# Patient Record
Sex: FEMALE | Race: WHITE | NOT HISPANIC OR LATINO | Employment: UNEMPLOYED | URBAN - METROPOLITAN AREA
[De-identification: names, ages, dates, MRNs, and addresses within clinical notes are randomized per-mention and may not be internally consistent; named-entity substitution may affect disease eponyms.]

---

## 2023-11-03 LAB
ABO + RH BLD: NORMAL
ANTIBODY SCREEN: NEGATIVE
HBV SURFACE AG SERPL QL IA: NEGATIVE
HCV AB SERPL QL IA: NEGATIVE
HIV 1+2 AB+HIV1 P24 AG SERPL QL IA: NEGATIVE
RUBELLA AB, IGG (OLG): 328.7 IU/ML
RUBELLA IMMUNE STATUS: NORMAL

## 2024-03-08 ENCOUNTER — APPOINTMENT (OUTPATIENT)
Dept: LAB | Facility: HOSPITAL | Age: 24
End: 2024-03-08
Attending: OBSTETRICS & GYNECOLOGY
Payer: MEDICAID

## 2024-03-08 DIAGNOSIS — T50.905A IMPAIRED GLUCOSE TOLERANCE ASSOCIATED WITH DRUGS: Primary | ICD-10-CM

## 2024-03-08 DIAGNOSIS — O36.0120 MATERNAL CARE FOR ANTI-D (RH) ANTIBODIES IN SECOND TRIMESTER: ICD-10-CM

## 2024-03-08 DIAGNOSIS — R73.02 IMPAIRED GLUCOSE TOLERANCE ASSOCIATED WITH DRUGS: Primary | ICD-10-CM

## 2024-03-08 LAB
GLUCOSE 1H P 100 G GLC PO SERPL-MCNC: 183 MG/DL (ref 100–180)
GLUCOSE 2H P 100 G GLC PO SERPL-MCNC: 176 MG/DL (ref 70–140)
GLUCOSE 3H P 100 G GLC PO SERPL-MCNC: 141 MG/DL (ref 70–115)
GLUCOSE P FAST SERPL-MCNC: 78 MG/DL (ref 70–100)
GROUP & RH: NORMAL
INDIRECT COOMBS: NORMAL
PATH REV: NORMAL
POCT GLUCOSE: 84 MG/DL (ref 70–110)
SPECIMEN OUTDATE: NORMAL

## 2024-03-08 PROCEDURE — 86850 RBC ANTIBODY SCREEN: CPT | Performed by: OBSTETRICS & GYNECOLOGY

## 2024-03-08 PROCEDURE — 82947 ASSAY GLUCOSE BLOOD QUANT: CPT

## 2024-03-08 PROCEDURE — 82951 GLUCOSE TOLERANCE TEST (GTT): CPT

## 2024-03-08 PROCEDURE — 82952 GTT-ADDED SAMPLES: CPT

## 2024-03-08 PROCEDURE — 36415 COLL VENOUS BLD VENIPUNCTURE: CPT

## 2024-03-08 PROCEDURE — 82950 GLUCOSE TEST: CPT

## 2024-03-20 ENCOUNTER — LAB VISIT (OUTPATIENT)
Dept: LAB | Facility: HOSPITAL | Age: 24
End: 2024-03-20
Attending: OBSTETRICS & GYNECOLOGY
Payer: MEDICAID

## 2024-03-20 DIAGNOSIS — Z36.89 SCREENING FOR PREGNANCY-ASSOCIATED PLASMA PROTEIN A: Primary | ICD-10-CM

## 2024-03-20 LAB
ALBUMIN SERPL-MCNC: 2.7 G/DL (ref 3.5–5)
ALP SERPL-CCNC: 155 UNIT/L (ref 40–150)
ALT SERPL-CCNC: 38 UNIT/L (ref 0–55)
AST SERPL-CCNC: 39 UNIT/L (ref 5–34)
BILIRUB SERPL-MCNC: 0.4 MG/DL
BILIRUBIN DIRECT+TOT PNL SERPL-MCNC: 0.2 MG/DL (ref 0–0.8)
BILIRUBIN DIRECT+TOT PNL SERPL-MCNC: 0.2 MG/DL (ref 0–?)
PATH REV: NORMAL
PROT SERPL-MCNC: 5.9 GM/DL (ref 6.4–8.3)

## 2024-03-20 PROCEDURE — 36415 COLL VENOUS BLD VENIPUNCTURE: CPT

## 2024-03-20 PROCEDURE — 80076 HEPATIC FUNCTION PANEL: CPT

## 2024-03-20 PROCEDURE — 82542 COL CHROMOTOGRAPHY QUAL/QUAN: CPT

## 2024-03-22 LAB
BILE AC SERPL-SCNC: 14.42 NMOL/ML
CDCAE SERPL-SCNC: 3.76 NMOL/ML
CHOLATE SERPL-SCNC: 7.61 NMOL/ML
DO-CHOLATE SERPL-SCNC: 2.43 NMOL/ML
URSODEOXYCHOLATE SERPL-SCNC: 0.62 NMOL/ML

## 2024-03-25 DIAGNOSIS — R79.89 ELEVATED LFTS: ICD-10-CM

## 2024-03-25 DIAGNOSIS — Z36.89 ENCOUNTER FOR FETAL ANATOMIC SURVEY: Primary | ICD-10-CM

## 2024-03-25 DIAGNOSIS — L29.9 ITCHING: ICD-10-CM

## 2024-03-27 ENCOUNTER — OFFICE VISIT (OUTPATIENT)
Dept: MATERNAL FETAL MEDICINE | Facility: CLINIC | Age: 24
End: 2024-03-27
Payer: MEDICAID

## 2024-03-27 ENCOUNTER — PROCEDURE VISIT (OUTPATIENT)
Dept: MATERNAL FETAL MEDICINE | Facility: CLINIC | Age: 24
End: 2024-03-27
Payer: MEDICAID

## 2024-03-27 ENCOUNTER — LAB VISIT (OUTPATIENT)
Dept: LAB | Facility: HOSPITAL | Age: 24
End: 2024-03-27
Attending: OBSTETRICS & GYNECOLOGY
Payer: MEDICAID

## 2024-03-27 VITALS
DIASTOLIC BLOOD PRESSURE: 85 MMHG | HEART RATE: 82 BPM | SYSTOLIC BLOOD PRESSURE: 126 MMHG | HEIGHT: 66 IN | WEIGHT: 150 LBS | BODY MASS INDEX: 24.11 KG/M2

## 2024-03-27 DIAGNOSIS — Z67.91 RH NEGATIVE STATUS DURING PREGNANCY IN THIRD TRIMESTER: ICD-10-CM

## 2024-03-27 DIAGNOSIS — R79.89 ELEVATED LFTS: ICD-10-CM

## 2024-03-27 DIAGNOSIS — R74.8 ELEVATED LIVER ENZYMES: ICD-10-CM

## 2024-03-27 DIAGNOSIS — O24.419 GESTATIONAL DIABETES MELLITUS (GDM) IN THIRD TRIMESTER, GESTATIONAL DIABETES METHOD OF CONTROL UNSPECIFIED: ICD-10-CM

## 2024-03-27 DIAGNOSIS — F41.9 ANXIETY DURING PREGNANCY IN THIRD TRIMESTER, ANTEPARTUM: ICD-10-CM

## 2024-03-27 DIAGNOSIS — O16.3 ELEVATED BLOOD PRESSURE AFFECTING PREGNANCY IN THIRD TRIMESTER, ANTEPARTUM: ICD-10-CM

## 2024-03-27 DIAGNOSIS — L29.9 ITCHING: ICD-10-CM

## 2024-03-27 DIAGNOSIS — O26.643 CHOLESTASIS DURING PREGNANCY IN THIRD TRIMESTER: Primary | ICD-10-CM

## 2024-03-27 DIAGNOSIS — Z36.89 ENCOUNTER FOR FETAL ANATOMIC SURVEY: ICD-10-CM

## 2024-03-27 DIAGNOSIS — O99.343 ANXIETY DURING PREGNANCY IN THIRD TRIMESTER, ANTEPARTUM: ICD-10-CM

## 2024-03-27 DIAGNOSIS — O26.893 RH NEGATIVE STATUS DURING PREGNANCY IN THIRD TRIMESTER: ICD-10-CM

## 2024-03-27 LAB
ALT SERPL-CCNC: 19 UNIT/L (ref 0–55)
AST SERPL-CCNC: 19 UNIT/L (ref 5–34)
CREAT SERPL-MCNC: 0.87 MG/DL (ref 0.55–1.02)
ERYTHROCYTE [DISTWIDTH] IN BLOOD BY AUTOMATED COUNT: 13.2 % (ref 11.5–17)
GFR SERPLBLD CREATININE-BSD FMLA CKD-EPI: >60 MLS/MIN/1.73/M2
HCT VFR BLD AUTO: 36.7 % (ref 37–47)
HGB BLD-MCNC: 11.9 G/DL (ref 12–16)
LDH SERPL-CCNC: 178 U/L (ref 125–220)
MCH RBC QN AUTO: 29 PG (ref 27–31)
MCHC RBC AUTO-ENTMCNC: 32.4 G/DL (ref 33–36)
MCV RBC AUTO: 89.5 FL (ref 80–94)
NRBC BLD AUTO-RTO: 0 %
PLATELET # BLD AUTO: 263 X10(3)/MCL (ref 130–400)
PMV BLD AUTO: 9 FL (ref 7.4–10.4)
RBC # BLD AUTO: 4.1 X10(6)/MCL (ref 4.2–5.4)
URATE SERPL-MCNC: 4.5 MG/DL (ref 2.6–6)
WBC # SPEC AUTO: 13.2 X10(3)/MCL (ref 4.5–11.5)

## 2024-03-27 PROCEDURE — 3079F DIAST BP 80-89 MM HG: CPT | Mod: CPTII,S$GLB,, | Performed by: OBSTETRICS & GYNECOLOGY

## 2024-03-27 PROCEDURE — 36415 COLL VENOUS BLD VENIPUNCTURE: CPT

## 2024-03-27 PROCEDURE — 84450 TRANSFERASE (AST) (SGOT): CPT

## 2024-03-27 PROCEDURE — 76805 OB US >/= 14 WKS SNGL FETUS: CPT | Mod: S$GLB,,, | Performed by: OBSTETRICS & GYNECOLOGY

## 2024-03-27 PROCEDURE — 76819 FETAL BIOPHYS PROFIL W/O NST: CPT | Mod: S$GLB,,, | Performed by: OBSTETRICS & GYNECOLOGY

## 2024-03-27 PROCEDURE — 84460 ALANINE AMINO (ALT) (SGPT): CPT

## 2024-03-27 PROCEDURE — 99204 OFFICE O/P NEW MOD 45 MIN: CPT | Mod: TH,S$GLB,, | Performed by: OBSTETRICS & GYNECOLOGY

## 2024-03-27 PROCEDURE — 1160F RVW MEDS BY RX/DR IN RCRD: CPT | Mod: CPTII,S$GLB,, | Performed by: OBSTETRICS & GYNECOLOGY

## 2024-03-27 PROCEDURE — 84550 ASSAY OF BLOOD/URIC ACID: CPT

## 2024-03-27 PROCEDURE — 83615 LACTATE (LD) (LDH) ENZYME: CPT

## 2024-03-27 PROCEDURE — 82565 ASSAY OF CREATININE: CPT

## 2024-03-27 PROCEDURE — 3008F BODY MASS INDEX DOCD: CPT | Mod: CPTII,S$GLB,, | Performed by: OBSTETRICS & GYNECOLOGY

## 2024-03-27 PROCEDURE — 3074F SYST BP LT 130 MM HG: CPT | Mod: CPTII,S$GLB,, | Performed by: OBSTETRICS & GYNECOLOGY

## 2024-03-27 PROCEDURE — 1159F MED LIST DOCD IN RCRD: CPT | Mod: CPTII,S$GLB,, | Performed by: OBSTETRICS & GYNECOLOGY

## 2024-03-27 PROCEDURE — 85027 COMPLETE CBC AUTOMATED: CPT

## 2024-03-27 RX ORDER — HYDROXYZINE HYDROCHLORIDE 25 MG/1
25 TABLET, FILM COATED ORAL 3 TIMES DAILY PRN
Qty: 60 TABLET | Refills: 2 | Status: SHIPPED | OUTPATIENT
Start: 2024-03-27

## 2024-03-27 RX ORDER — LACTULOSE 10 G/15ML
SOLUTION ORAL; RECTAL
COMMUNITY
Start: 2024-03-23 | End: 2024-04-08

## 2024-03-27 RX ORDER — URSODIOL 500 MG/1
500 TABLET, FILM COATED ORAL 2 TIMES DAILY
Qty: 60 TABLET | Refills: 3 | Status: SHIPPED | OUTPATIENT
Start: 2024-03-27 | End: 2025-03-27

## 2024-03-27 RX ORDER — INSULIN PUMP SYRINGE, 3 ML
EACH MISCELLANEOUS
Qty: 1 EACH | Refills: 0 | Status: SHIPPED | OUTPATIENT
Start: 2024-03-27 | End: 2024-04-08

## 2024-03-27 RX ORDER — CALCIUM CARBONATE 500(1250)
2 TABLET,CHEWABLE ORAL DAILY
COMMUNITY
End: 2024-04-08

## 2024-03-27 RX ORDER — BLOOD-GLUCOSE CONTROL, NORMAL
1 EACH MISCELLANEOUS 4 TIMES DAILY
Qty: 100 EACH | Refills: 4 | Status: SHIPPED | OUTPATIENT
Start: 2024-03-27 | End: 2024-04-08

## 2024-03-27 NOTE — PROGRESS NOTES
Maternal Fetal Medicine New Consult    Subjective:     Patient ID: [id number]    Chief Complaint: MFM consult with US (Cholestasis,  Rh Negative.    )      HPI: 23 y.o.  female  at 33w1d gestation with Estimated Date of Delivery: 24 by LMP, consistent with early US. She is sent for MFM consultation for elevated LFTs, elevated bile acids.      Patient reports generalized itching concentrated to the hands and feet as well as abdomen and back for the past 2 weeks.  On 3/20/2024, she had mildly elevated LFTs with ALT 38,AST 39, and mildly elevated bile acids at 14.42.  She is Rh negative and received RhoGAM on 3/8/2024.  She had elevated 3 hour GTT diagnostic of GDM on 3/8/2024.  She has not started checking sugars or following diabetic diet yet.  She has history of anxiety and is currently on Lexapro 20 mg daily and feeling well.  No acute symptoms at this time.  She is accompanied by her mother Maria Ines       She denies any personal or family history of aneuploidy or anomalies. She denies any exposure to high fevers, viral rashes, illicit drugs or alcohol in this pregnancy.  She denies any leaking fluid, vaginal bleeding, contractions, decreased fetal movement. Denies headaches, visual disturbances, or epigastric pain.       Pregnancy complications include:  Patient Active Problem List   Diagnosis    Cholestasis during pregnancy in third trimester    Rh negative status during pregnancy in third trimester    Gestational diabetes mellitus (GDM) in third trimester    Anxiety during pregnancy in third trimester, antepartum    Elevated blood pressure affecting pregnancy in third trimester, antepartum       Past Medical History:   Diagnosis Date    Anxiety     Pregnancy 2023       History reviewed. No pertinent surgical history.    Family History   Problem Relation Age of Onset    Eclampsia Paternal Grandfather     Diabetes Paternal Grandfather      labor Paternal Grandmother     Eclampsia Paternal  Grandmother     Stroke Maternal Grandmother     Miscarriages / Stillbirths Maternal Grandmother     Diabetes Maternal Grandmother     Eclampsia Maternal Grandfather        Social History     Socioeconomic History    Marital status:    Tobacco Use    Smoking status: Former     Types: Vaping with nicotine, Cigarettes    Smokeless tobacco: Never   Substance and Sexual Activity    Alcohol use: Never     Comment: a drink once every so many months    Drug use: Not Currently     Types: Marijuana     Comment: SSRIS Lexapro currently    Sexual activity: Yes     Partners: Male     Birth control/protection: None       Current Outpatient Medications   Medication Sig Dispense Refill    calcium carbonate (OS-ALMA ROSA) 500 mg calcium (1,250 mg) chewable tablet Take 1 tablet by mouth once daily.      EScitalopram oxalate (LEXAPRO) 20 MG tablet       VITAFOL GUMMIES 3.33 mg iron- 0.33 mg Chew Take 1 tablet by mouth.      blood sugar diagnostic Strp 1 each by Misc.(Non-Drug; Combo Route) route 4 (four) times daily. 200 each 4    blood-glucose meter kit Use as instructed to check blood glucose 1 each 0    hydrOXYzine HCL (ATARAX) 25 MG tablet Take 1 tablet (25 mg total) by mouth 3 (three) times daily as needed for Itching. 60 tablet 2    lactulose (CHRONULAC) 10 gram/15 mL solution TAKE 15 ML BY MOUTH DAILY AS NEEDED FOR CONSTIPATION      lancets 30 gauge Misc 1 lancet  by Misc.(Non-Drug; Combo Route) route 4 (four) times daily. 100 each 4    prenatal 26-iron ps-folic-dha (VITAFOL-ONE) 29 mg iron- 1 mg-200 mg Cap Take 1 tablet by mouth Daily. (Patient not taking: Reported on 3/27/2024) 30 capsule 11    prenatal no122/iron/folic acid (PRENATAL MULTI ORAL) Take by mouth.      ursodioL (ACTIGALL) 500 MG tablet Take 1 tablet (500 mg total) by mouth 2 (two) times daily. 60 tablet 3     No current facility-administered medications for this visit.       Review of patient's allergies indicates:  No Known Allergies    Medications:  Current  "Outpatient Medications   Medication Instructions    blood sugar diagnostic Strp 1 each, Misc.(Non-Drug; Combo Route), 4 times daily    blood-glucose meter kit Use as instructed to check blood glucose    calcium carbonate (OS-ALMA ROSA) 500 mg calcium (1,250 mg) chewable tablet 1 tablet, Oral, Daily    EScitalopram oxalate (LEXAPRO) 20 MG tablet No dose, route, or frequency recorded.    hydrOXYzine HCL (ATARAX) 25 mg, Oral, 3 times daily PRN    lactulose (CHRONULAC) 10 gram/15 mL solution TAKE 15 ML BY MOUTH DAILY AS NEEDED FOR CONSTIPATION    lancets 30 gauge Misc 1 lancet , Misc.(Non-Drug; Combo Route), 4 times daily    prenatal 26-iron ps-folic-dha (VITAFOL-ONE) 29 mg iron- 1 mg-200 mg Cap 1 tablet, Oral, Daily    prenatal no122/iron/folic acid (PRENATAL MULTI ORAL) Oral    ursodioL (ACTIGALL) 500 mg, Oral, 2 times daily    VITAFOL GUMMIES 3.33 mg iron- 0.33 mg Chew 1 tablet, Oral       Review of Systems   12 point review of systems conducted, negative except as stated in the history of present illness. See HPI for details.      Objective:     Visit Vitals  /85 (BP Location: Left arm, Patient Position: Sitting, BP Method: Medium (Automatic))   Pulse 82   Ht 5' 6" (1.676 m)   Wt 68 kg (150 lb)   LMP 08/08/2023 (Approximate)   BMI 24.21 kg/m²        Physical Exam  Vitals and nursing note reviewed.   Constitutional:       General: She is not in acute distress.     Appearance: Normal appearance.   HENT:      Head: Normocephalic and atraumatic.      Nose: Nose normal. No congestion.      Mouth/Throat:      Pharynx: Oropharynx is clear.   Eyes:      General: No scleral icterus.     Conjunctiva/sclera: Conjunctivae normal.   Cardiovascular:      Rate and Rhythm: Normal rate and regular rhythm.   Pulmonary:      Effort: No respiratory distress.      Breath sounds: Normal breath sounds. No wheezing.   Abdominal:      General: Abdomen is flat.      Palpations: Abdomen is soft.      Tenderness: There is no abdominal " tenderness. There is no right CVA tenderness, left CVA tenderness or guarding.      Comments: No CVA tenderness gravid uterus.    Musculoskeletal:         General: Normal range of motion.      Cervical back: Neck supple.      Right lower leg: No edema.      Left lower leg: No edema.   Skin:     General: Skin is warm.      Findings: No bruising or rash.   Neurological:      General: No focal deficit present.      Mental Status: She is oriented to person, place, and time.      Deep Tendon Reflexes: Reflexes normal.      Comments: Normal reflexes   Psychiatric:         Mood and Affect: Mood normal.         Behavior: Behavior normal.         Thought Content: Thought content normal.         Judgment: Judgment normal.         Assessment/Plan:     23 y.o.  female with IUP at 33w1d    Cholestasis of pregnancy  There is normal fetal growth with an EFW of 2285 g at the 36% and the AC at the 68% on 3/27/2024.  AFV is normal. BPP is 8/8 today.     Cholestasis of pregnancy is usually diagnosed mid to late pregnancy and is characterized by significant, generalized itching that sometimes worsens at night and elevated serum bile acids/amniotransferases in the absence of other hepatic pathology. Etiology is unknown, but seems to have genetic predisposition, peak estrogens level in third trimester and/or environmental related. Incidence of recurrence is 60-70% and up to 90% in some studies, with risk of recurrence thought to be lower if index pregnancy was a multiple gestation pregnancy. There is increasing data to suggest a higher risk of hepatobiliary disease later in life. Patient should have repeated bile acids and LFT 6-8 weeks postpartum, and referral to appropriate specialist should be done if persistent abnormalities. Children of mothers with intrahepatic cholestasis of pregnancy may be at higher risk of increased BMI and dyslipidemia at 16 years of age.      Treatment is focused on reducing pruritus and preventing  maternal/fetal complications. There are risks for fat soluble vitamin deficiency, steatorrhea, Vitamin-K-deficiency-induced hypoprothrombinemia that could increase risk for hemorrhage.  There is small risk for fetal prematurity, meconium stained amniotic fluid,  respiratory distress syndrome and FDIU, particularly if serum bile acids are over 40 micromoles/L. Risk of fetal demise is increased if serum bile acid is over 100 micromoles/L.      Treatment of choice is Ursodeoxycholic acid given as 500mg po bid or 300mg tid (with dose adjusted up to 2 g/day if needed) till delivery. Second line choice is cholestyramine 8-16 g/day and starting dose 4g po bid; however, there is risk for steatorrhea as a side effect.  Unfortunately, a randomized study (PITCHES study, 2019) showed that treatment with ursodeoxycholic acid did not decrease adverse fetal outcome (composite of  death, NICU admissions or  delivery), had mild improvement in itching symptoms, and did not reduce bile acid levels, putting into question this treatment modality.  Symptoms may improve with treatment and resolve a few days post delivery with no long term sequela, except for potentially higher risk of hepatobiliary disease later in life as mentioned above.       Prescription sent for ursodiol 500 mg BID and Vistaril 25 mg TID PRN.      Gestational diabetes mellitus  The incidence of diabetes in pregnancy is 6-7%, and about 85% represent GDM. I discussed with her risks associated with diabetes in pregnancy including higher risk for polyhydramnios, fetal macrosomia, lower Apgar scores and  metabolic complications (hypoglycemia, hyperbilirubinemia, hypocalcemia, erythema) and developing preeclampsia    Currently, she was advised that she needs to be on 2200 calorie diabetic diet.It is recommended that she have 30-45 grams of carbohydrates with breakfast, a mid-morning snack with 15-30 grams of carbohydrates, 45-60 grams of  carbohydrates with lunch, a mid-afternoon snack with 15-30 grams of carbohydrates, 45-60 grams of carbohydrates with dinner, and a bedtime snack with 15-30 grams of carbohydrates. The importance of avoiding any significant weight gain till the end of the pregnancy was discussed.  She will be monitoring her sugars at home.  If the 1 hour postprandial greater than 140 and pre-prandial blood sugar greater than 90, then she will let me know.        I have shared with her the options of treatment including insulin treatment which is the gold standard of care for diabetes in pregnancy versus a recent use of alternatives, such as glyburide or metformin (both crosses the placenta). Although, glyburide has been used over the past 10 years as primary alternative to insulin, a recent meta-analysis (2015) suggested that metformin should be the alternative to insulin and not glyburide. The conclusion of the study showed a higher birth weight (100 g) associated with glyburide compared to insulin, two fold higher risk of  hypoglycemia, and more than 2x higher risk of macrosomia associated with glyburide use. This difference is likely to be secondary to hyperinsulinism in fetus secondary to fetal exposure to glyburide.  Metformin, also had lower maternal weight gain, lower birth rate and less risk of macrosomia when compared with glyburide. When compared to insulin, Metformin had lower maternal weight gain, increase in  birth and lower gestational age at delivery and lower incidence of gestational hypertension. There was a trend for less  hypoglycemia and high failure rate of 30-35%, when compared to insulin.  In addition, the lack of long term data on glyburide and metformin use regarding safety was addressed and recommended use of Insulin for treatment, which is the gold standard, with excellent efficiency and safety, albeit more inconvenience.  Questions were answered.     Advised patient to follow a  diabetic diet and start checking her sugars 4 times a day, and send log to us weekly.    If she does not need insulin, then regular pregnancy management could be done with consideration for fetal testing after 38 weeks gestation with awaiting normal spontaneous onset of labor and management of the pregnancy. If Insulin is needed, then closer fetal surveillance will be needed. She was advised to continue fetal kick counts 3 times daily, with immediate reporting of decreased fetal movements (<10 movements/hr).    The association of gestational diabetes (50%) with adult-onset type 2 diabetes mellitus was reviewed.  She was advised of importance of losing weight after the pregnancy, as well as doing a 75g 2hr glucose tolerance test after postpartum exam, and checkups every 1-3 years for blood sugars to make sure she does not develop diabetes.        Rh negative, antepartum  Routine administration of antepartum and postpartum anti-D immune globulin (RhoGAM) has reduced the prevalence of D alloimmunization to 0.1 to 0.3 percent of Rh-negative pregnant people. Patients who are Rh-negative should receive RhoGAM injection around 28 weeks and within 72 hours of delivery. Administration of RhoGAM is also recommended when there is an increased risk of fetomaternal bleeding. She was advised to report any vaginal bleeding during pregnancy to determine whether additional RhoGAM injection is indicated.       Labile blood pressure reading without history of hypertension  BP Readings from Last 1 Encounters:   03/27/24 126/85     Patient is asymptomatic.  Advised her that if blood pressure is persistently elevated it maybe concerning for development of gestational hypertension/preeclampsia.  Out of caution, it was agreed to do preeclampsia labs since she is at risk for preeclampsia.  Patient will go to do the lab work today.  Preeclampsia warnings were given with instructions to come in immediately if she has any headaches, vision  problems, epigastric pain, or decreased fetal movement at any time.        Anxiety during pregnancy, on Lexapro  Discussed risks with depression/anxiety and risks/benefits of medication use in pregnancy. Although earlier limited data suggested association of paroxetine (Paxil) with right ventricular outflow tract obstruction and sertraline (Zoloft) with ventricular septal heart defects, a recent (2014) large population based study showed no substantial increase in the risk of cardiac malformations attributable to antidepressant use in 1st trimester. The absolute risk of other reported risks in some studies is very small: omphalocele of 1 in 5,000 births, craniosynostosis 1 in 1,800 births, and anencephaly of 1 in 1,000 births. I discussed with her that the SSRI medication used in pregnancy is associated with potential small risk of pulmonary hypertension when used after 20 weeks gestation (in 6-12 per thousand exposed women). However, discontinuing medication is associated with a higher risk with recurrence of symptoms . Relapse rate is 68% if medication is discontinued, vs 25% with continued medication use. Untreated depression may increase the risk of low weight gain, sexually transmitted diseases, alcohol & substance abuse, all of which have maternal and fetal health implications. Factors associated with relapse during pregnancy include a history of more than 5 years of depressive illness and of more than four episodes of relapse.     ACOG recommends that therapy for mental health disorders during pregnancy be individualized. Treatment of anxiety and depression should incorporate the clinical expertise of her mental health clinician, her obstetrician, her primary care provider, and her pediatrician. The risks of untreated depression and the benefits of treatment must be weighed against the risks associated with the use of psychiatric medications during pregnancy.    Depending upon the severity of her symptoms and  previous response to discontinued medication, consider weaning down or off medications if possible. Although discontinuing the medication for a few weeks before delivery  has been suggested, to avoid  withdrawal, the potential risks to mom and lack of proven benefit from this approach, makes continuing medication till delivery a reasonable option. Discussed the association of higher  morbidity with SSRI medication use close to delivery (in 30 % of neonates) including higher rate of admission to intensive care unit, jitteriness, mild respiratory distress, tachypnea of the , weak cry, and poor tone.     With symptom control, reasonable to continue Lexapro 20 mg daily at this time. She was also advised to report any worsening of symptoms or SI/HI tendencies immediately to provider/ER for prompt intervention.  She was advised to continue follow up care with her Mental Health provider.      Follow up in about 5 days (around 2024) for [facility] follow-up, BPP.     Future Appointments   Date Time Provider Department Center   2024  2:30 PM Josse Rodriguez MD [facility] [place] [facility]   2024  2:30 PM ROOM 1 AND 2, [facility] [place] [facility]   4/10/2024  1:30 PM Suzie Mcintosh PA [facility]        New diagnosis of cholestasis of pregnancy, with mild elevation of bile acid.  His itching start patient on Ursodiol 500 mg b.i.d. along with Vistaril p.r.n..  We will be rechecking levels in a couple of weeks.  Blood pressure was labile or other preeclampsia labs that were reassuring.3/27/24: platelets 263, AST 19, ALT19, uric acid 4.5,cr 0.87, .  Patient was counseled on the management of gestational diabetes flatus.  Prescription for glucometer machine and diet information given.  Will monitor the sugars and treat if needed.  With mild elevation in bile acids patient could be delivered around 38 -39 weeks gestation, as long as symptoms are well controlled with no significant  increasing bile acids over 40, and no additional complications requiring earlier delivery. Patient's questions were answered.  She is in agreement with plan of care. Discussed with Dr Suazo, who will see patient later in week.         Patient was evaluated by LAURA Helms and Dr. Rodriguez.  Final assessment and recommendations as stated above were made by Dr. Rodriguez.    This note was created with the assistance of [employer] voice recognition software. There may be transcription errors as a result of using this technology, however minimal. Effort has been made to ensure accuracy of transcription, but any obvious errors or omissions should be clarified with the author of the document.

## 2024-03-28 DIAGNOSIS — R74.8 ELEVATED LIVER ENZYMES: ICD-10-CM

## 2024-03-28 DIAGNOSIS — Z36.89 ENCOUNTER FOR FETAL ANATOMIC SURVEY: Primary | ICD-10-CM

## 2024-03-28 DIAGNOSIS — R79.89 ELEVATED LFTS: ICD-10-CM

## 2024-04-01 ENCOUNTER — OFFICE VISIT (OUTPATIENT)
Dept: MATERNAL FETAL MEDICINE | Facility: CLINIC | Age: 24
End: 2024-04-01
Payer: MEDICAID

## 2024-04-01 ENCOUNTER — PROCEDURE VISIT (OUTPATIENT)
Dept: MATERNAL FETAL MEDICINE | Facility: CLINIC | Age: 24
End: 2024-04-01
Payer: MEDICAID

## 2024-04-01 VITALS
WEIGHT: 152 LBS | HEIGHT: 66 IN | BODY MASS INDEX: 24.43 KG/M2 | SYSTOLIC BLOOD PRESSURE: 127 MMHG | HEART RATE: 84 BPM | DIASTOLIC BLOOD PRESSURE: 81 MMHG

## 2024-04-01 DIAGNOSIS — O16.3 ELEVATED BLOOD PRESSURE AFFECTING PREGNANCY IN THIRD TRIMESTER, ANTEPARTUM: ICD-10-CM

## 2024-04-01 DIAGNOSIS — R74.8 ELEVATED LIVER ENZYMES: ICD-10-CM

## 2024-04-01 DIAGNOSIS — O26.643 CHOLESTASIS DURING PREGNANCY IN THIRD TRIMESTER: ICD-10-CM

## 2024-04-01 DIAGNOSIS — Z67.91 RH NEGATIVE STATUS DURING PREGNANCY IN THIRD TRIMESTER: ICD-10-CM

## 2024-04-01 DIAGNOSIS — R79.89 ELEVATED LFTS: ICD-10-CM

## 2024-04-01 DIAGNOSIS — O26.893 RH NEGATIVE STATUS DURING PREGNANCY IN THIRD TRIMESTER: ICD-10-CM

## 2024-04-01 DIAGNOSIS — O99.343 ANXIETY DURING PREGNANCY IN THIRD TRIMESTER, ANTEPARTUM: Primary | ICD-10-CM

## 2024-04-01 DIAGNOSIS — O24.419 GESTATIONAL DIABETES MELLITUS (GDM) IN THIRD TRIMESTER, GESTATIONAL DIABETES METHOD OF CONTROL UNSPECIFIED: ICD-10-CM

## 2024-04-01 DIAGNOSIS — F41.9 ANXIETY DURING PREGNANCY IN THIRD TRIMESTER, ANTEPARTUM: Primary | ICD-10-CM

## 2024-04-01 DIAGNOSIS — Z36.89 ENCOUNTER FOR FETAL ANATOMIC SURVEY: ICD-10-CM

## 2024-04-01 PROCEDURE — 3074F SYST BP LT 130 MM HG: CPT | Mod: CPTII,S$GLB,, | Performed by: OBSTETRICS & GYNECOLOGY

## 2024-04-01 PROCEDURE — 99214 OFFICE O/P EST MOD 30 MIN: CPT | Mod: TH,S$GLB,, | Performed by: OBSTETRICS & GYNECOLOGY

## 2024-04-01 PROCEDURE — 76819 FETAL BIOPHYS PROFIL W/O NST: CPT | Mod: S$GLB,,, | Performed by: OBSTETRICS & GYNECOLOGY

## 2024-04-01 PROCEDURE — 3079F DIAST BP 80-89 MM HG: CPT | Mod: CPTII,S$GLB,, | Performed by: OBSTETRICS & GYNECOLOGY

## 2024-04-01 PROCEDURE — 1160F RVW MEDS BY RX/DR IN RCRD: CPT | Mod: CPTII,S$GLB,, | Performed by: OBSTETRICS & GYNECOLOGY

## 2024-04-01 PROCEDURE — 3008F BODY MASS INDEX DOCD: CPT | Mod: CPTII,S$GLB,, | Performed by: OBSTETRICS & GYNECOLOGY

## 2024-04-01 PROCEDURE — 1159F MED LIST DOCD IN RCRD: CPT | Mod: CPTII,S$GLB,, | Performed by: OBSTETRICS & GYNECOLOGY

## 2024-04-01 RX ORDER — LANCETS 33 GAUGE
EACH MISCELLANEOUS 4 TIMES DAILY
COMMUNITY
Start: 2024-03-28

## 2024-04-01 RX ORDER — BLOOD-GLUCOSE METER
EACH MISCELLANEOUS
COMMUNITY
Start: 2024-03-28

## 2024-04-01 NOTE — PROGRESS NOTES
Maternal Fetal Medicine Follow Up    Subjective:     Patient ID: [id number]    Chief Complaint: MFM Follow up with US (GDM.  Patient is having dizziness and cramping.  Patient is having itching.)      HPI: Em Longoria is a 23 y.o. female  at 33w6d gestation with Estimated Date of Delivery: 24  who is here for follow-up consultation by M.    Patient was diagnosed with cholestasis on 3/20/2024 after she had labs for reported generalized itching.  On 3/20/2024, she had mildly elevated LFTs with ALT 38, AST 39, and mildly elevated bile acids at 14.42.  She was on ursodiol 500 mg twice daily and Vistaril 25 mg 3 times a day as needed.  She is Rh negative and received RhoGAM on 3/8/2024.  She has GDM and is following a diabetic diet and monitoring her sugars.  Her fasting blood sugars are up to 93 and all postprandial blood sugars are normal less than 122 1 hour postprandials.  She has history of anxiety and is currently on Lexapro 20 mg daily and feeling well.  No acute symptoms at this time.  She had a labile blood pressure reading on 3/27/2024, for which preeclampsia labs were ordered and were reassuring with platelets 263, AST 19, ALT19, uric acid 4.5, creatinine 0.87, .  Patient is accompanied by her     Patient reported that she had more itching last night and this morning but she has not tried the Vistaril yet.       Interval history since last M visit: None.. She denies any leaking fluid, vaginal bleeding, contractions, decreased fetal movement. Denies headaches, visual disturbances, or epigastric pain.    Pregnancy complications include:   Patient Active Problem List   Diagnosis    Cholestasis during pregnancy in third trimester    Rh negative status during pregnancy in third trimester    Gestational diabetes mellitus (GDM) in third trimester    Anxiety during pregnancy in third trimester, antepartum    Elevated blood pressure affecting pregnancy in third trimester, antepartum       No  "changes to medical, surgical, family, social, or obstetric history.    Medications:  Current Outpatient Medications   Medication Instructions    blood sugar diagnostic Strp 1 each, Misc.(Non-Drug; Combo Route), 4 times daily    blood-glucose meter kit Use as instructed to check blood glucose    calcium carbonate (OS-ALMA ROSA) 500 mg calcium (1,250 mg) chewable tablet 1 tablet, Oral, Daily    EScitalopram oxalate (LEXAPRO) 20 MG tablet No dose, route, or frequency recorded.    hydrOXYzine HCL (ATARAX) 25 mg, Oral, 3 times daily PRN    lactulose (CHRONULAC) 10 gram/15 mL solution TAKE 15 ML BY MOUTH DAILY AS NEEDED FOR CONSTIPATION    lancets 30 gauge Misc 1 lancet , Misc.(Non-Drug; Combo Route), 4 times daily    prenatal 26-iron ps-folic-dha (VITAFOL-ONE) 29 mg iron- 1 mg-200 mg Cap 1 tablet, Oral, Daily    prenatal no122/iron/folic acid (PRENATAL MULTI ORAL) Oral    TRUE METRIX GLUCOSE METER Misc USE AS INSTRUCTED TO CHECK BLOOD GLUCOSSE    TRUEPLUS LANCETS 33 gauge Misc Topical (Top), 4 times daily    ursodioL (ACTIGALL) 500 mg, Oral, 2 times daily    VITAFOL GUMMIES 3.33 mg iron- 0.33 mg Chew 1 tablet, Oral       Review of Systems   12 point review of systems conducted, negative except as stated in the history of present illness. See HPI for details.      Objective:     Visit Vitals  /81 (BP Location: Left arm, Patient Position: Sitting, BP Method: Medium (Automatic))   Pulse 84   Ht 5' 6" (1.676 m)   Wt 68.9 kg (152 lb)   LMP 08/08/2023 (Approximate)   BMI 24.53 kg/m²        Physical Exam  Vitals and nursing note reviewed.   Constitutional:       Appearance: Normal appearance.   HENT:      Head: Normocephalic and atraumatic.      Nose: Nose normal. No congestion.   Cardiovascular:      Rate and Rhythm: Normal rate.   Pulmonary:      Effort: Pulmonary effort is normal.   Skin:     Findings: No rash.   Neurological:      Mental Status: She is alert and oriented to person, place, and time.   Psychiatric:         " Mood and Affect: Mood normal.         Behavior: Behavior normal.         Thought Content: Thought content normal.         Judgment: Judgment normal.         Assessment/Plan:     23 y.o.  female with IUP at 33w6d    Cholestasis of pregnancy  There is normal fetal growth with an EFW of 2285 g at the 36% and the AC at the 68% on 3/27/2024.  AFV is normal. BPP is 8/8 today.     She will continue ursodiol 500 mg twice daily and Vistaril 25 mg 3 times a day as needed.    Although no solid data or recommendation for  care, I suggest twice weekly  testing till delivery understanding that no treatment is fully protective against fetal demise. I also suggest delivery at 37-38 weeks gestation, depending on the severity of the disease and symptoms.    There is increasing data to suggest a higher risk of hepatobiliary disease later in life. Patient should have repeated bile acids and LFT 6-8 weeks postpartum, and referral to appropriate specialist should be done if persistent abnormalities. Children of mothers with intrahepatic cholestasis of pregnancy may be at higher risk of increased BMI and dyslipidemia at age of 16 years.      With patient having more itching last night an order for a follow-up liver function tests and bile acid to be done Thursday after to be on the medication for a week with the patient to take Vistaril as needed.      Gestational diabetes mellitus  Reviewed with her risks associated with diabetes in pregnancy including higher risk for polyhydramnios, fetal macrosomia, lower Apgar scores and  metabolic complications (hypoglycemia, hyperbilirubinemia, hypocalcemia, erythema) and developing preeclampsia    Continue 2200 calorie diabetic diet.      Log reviewed.  With all postprandial blood sugars are normal and some borderline fasting blood sugars, patient was advised to check her sugars 2 times a day including daily fasting and 1 additional postprandial rotating different  meals.  With normal values, no treatment is needed at this time.     She was advised to continue fetal kick counts 3 times daily, with immediate reporting of decreased fetal movements (<10 movements/hr).    The association of gestational diabetes (50%) with adult-onset type 2 diabetes mellitus was reviewed.  She was advised of importance of losing weight after the pregnancy, as well as doing a 75g 2hr glucose tolerance test after postpartum exam, and checkups every 1-3 years for blood sugars to make sure she does not develop diabetes.        Rh negative, antepartum  Routine administration of antepartum and postpartum anti-D immune globulin (RhoGAM) has reduced the prevalence of D alloimmunization to 0.1 to 0.3 percent of Rh-negative pregnant people. Patients who are Rh-negative should receive RhoGAM injection around 28 weeks and within 72 hours of delivery. Administration of RhoGAM is also recommended when there is an increased risk of fetomaternal bleeding. She was advised to report any vaginal bleeding during pregnancy to determine whether additional RhoGAM injection is indicated.       Labile blood pressure reading without history of hypertension with normal blood pressure on follow-up and normal preeclampsia labs  BP Readings from Last 1 Encounters:   04/01/24 127/81     Patient is asymptomatic.  Advised her that if blood pressure is persistently elevated it maybe concerning for development of gestational hypertension/preeclampsia. Preeclampsia warnings were given with instructions to come in immediately if she has any headaches, vision problems, epigastric pain, or decreased fetal movement at any time.        Anxiety during pregnancy, on Lexapro   ACOG recommends that therapy for mental health disorders during pregnancy be individualized. Treatment of anxiety and depression should incorporate the clinical expertise of her mental health clinician, her obstetrician, her primary care provider, and her  pediatrician. The risks of untreated depression and the benefits of treatment must be weighed against the risks associated with the use of psychiatric medications during pregnancy.    With symptom control, reasonable to continue Lexapro 20 mg daily at this time. She was also advised to report any worsening of symptoms or SI/HI tendencies immediately to provider/ER for prompt intervention.  She was advised to continue follow up care with her Mental Health provider.    Follow up in about 1 week (around 4/8/2024) for M follow-up, BPP.     Future Appointments   Date Time Provider Department Center   4/4/2024  3:45 PM Guero Suazo [facility]   4/10/2024  1:30 PM Suzie Mcintosh PA [facility]        ULI involvement: Patient was evaluated and examined by Dr. Rodriguez. LAURA Helms, helped in pre charting of part of note.    This note was created with the assistance of [employer] voice recognition software. There may be transcription errors as a result of using this technology, however minimal. Effort has been made to ensure accuracy of transcription, but any obvious errors or omissions should be clarified with the author of the document.

## 2024-04-02 ENCOUNTER — TELEPHONE (OUTPATIENT)
Dept: MATERNAL FETAL MEDICINE | Facility: CLINIC | Age: 24
End: 2024-04-02
Payer: MEDICAID

## 2024-04-02 NOTE — TELEPHONE ENCOUNTER
----- Message from Jennifer Richard sent at 4/2/2024  2:41 PM CDT -----  Regarding: Medication  Patient needs to know if she can take lactulose with the rest of the medication she is on. She said that she previously reported she wasn't taking it, but feels that she needs to now. Her phone number is [phone]

## 2024-04-04 ENCOUNTER — LAB VISIT (OUTPATIENT)
Dept: LAB | Facility: HOSPITAL | Age: 24
End: 2024-04-04
Attending: OBSTETRICS & GYNECOLOGY
Payer: MEDICAID

## 2024-04-04 DIAGNOSIS — O26.643 CHOLESTASIS DURING PREGNANCY IN THIRD TRIMESTER: ICD-10-CM

## 2024-04-04 DIAGNOSIS — O24.419 GESTATIONAL DIABETES MELLITUS (GDM) IN THIRD TRIMESTER, GESTATIONAL DIABETES METHOD OF CONTROL UNSPECIFIED: Primary | ICD-10-CM

## 2024-04-04 LAB
ALT SERPL-CCNC: 19 UNIT/L (ref 0–55)
AST SERPL-CCNC: 22 UNIT/L (ref 5–34)

## 2024-04-04 PROCEDURE — 84450 TRANSFERASE (AST) (SGOT): CPT

## 2024-04-04 PROCEDURE — 36415 COLL VENOUS BLD VENIPUNCTURE: CPT

## 2024-04-04 PROCEDURE — 82542 COL CHROMOTOGRAPHY QUAL/QUAN: CPT

## 2024-04-04 PROCEDURE — 84460 ALANINE AMINO (ALT) (SGPT): CPT

## 2024-04-08 ENCOUNTER — OFFICE VISIT (OUTPATIENT)
Dept: MATERNAL FETAL MEDICINE | Facility: CLINIC | Age: 24
End: 2024-04-08
Payer: MEDICAID

## 2024-04-08 ENCOUNTER — PROCEDURE VISIT (OUTPATIENT)
Dept: MATERNAL FETAL MEDICINE | Facility: CLINIC | Age: 24
End: 2024-04-08
Payer: MEDICAID

## 2024-04-08 VITALS
WEIGHT: 152 LBS | HEART RATE: 77 BPM | SYSTOLIC BLOOD PRESSURE: 117 MMHG | HEIGHT: 66 IN | BODY MASS INDEX: 24.43 KG/M2 | DIASTOLIC BLOOD PRESSURE: 78 MMHG

## 2024-04-08 DIAGNOSIS — O26.893 RH NEGATIVE STATUS DURING PREGNANCY IN THIRD TRIMESTER: ICD-10-CM

## 2024-04-08 DIAGNOSIS — O99.343 ANXIETY DURING PREGNANCY IN THIRD TRIMESTER, ANTEPARTUM: Primary | ICD-10-CM

## 2024-04-08 DIAGNOSIS — O26.643 CHOLESTASIS DURING PREGNANCY IN THIRD TRIMESTER: ICD-10-CM

## 2024-04-08 DIAGNOSIS — O16.3 ELEVATED BLOOD PRESSURE AFFECTING PREGNANCY IN THIRD TRIMESTER, ANTEPARTUM: ICD-10-CM

## 2024-04-08 DIAGNOSIS — O24.419 GESTATIONAL DIABETES MELLITUS (GDM) IN THIRD TRIMESTER, GESTATIONAL DIABETES METHOD OF CONTROL UNSPECIFIED: ICD-10-CM

## 2024-04-08 DIAGNOSIS — F41.9 ANXIETY DURING PREGNANCY IN THIRD TRIMESTER, ANTEPARTUM: Primary | ICD-10-CM

## 2024-04-08 DIAGNOSIS — Z67.91 RH NEGATIVE STATUS DURING PREGNANCY IN THIRD TRIMESTER: ICD-10-CM

## 2024-04-08 PROCEDURE — 1160F RVW MEDS BY RX/DR IN RCRD: CPT | Mod: CPTII,S$GLB,, | Performed by: OBSTETRICS & GYNECOLOGY

## 2024-04-08 PROCEDURE — 76819 FETAL BIOPHYS PROFIL W/O NST: CPT | Mod: S$GLB,,, | Performed by: OBSTETRICS & GYNECOLOGY

## 2024-04-08 PROCEDURE — 3074F SYST BP LT 130 MM HG: CPT | Mod: CPTII,S$GLB,, | Performed by: OBSTETRICS & GYNECOLOGY

## 2024-04-08 PROCEDURE — 3008F BODY MASS INDEX DOCD: CPT | Mod: CPTII,S$GLB,, | Performed by: OBSTETRICS & GYNECOLOGY

## 2024-04-08 PROCEDURE — 99213 OFFICE O/P EST LOW 20 MIN: CPT | Mod: TH,S$GLB,, | Performed by: OBSTETRICS & GYNECOLOGY

## 2024-04-08 PROCEDURE — 1159F MED LIST DOCD IN RCRD: CPT | Mod: CPTII,S$GLB,, | Performed by: OBSTETRICS & GYNECOLOGY

## 2024-04-08 PROCEDURE — 3078F DIAST BP <80 MM HG: CPT | Mod: CPTII,S$GLB,, | Performed by: OBSTETRICS & GYNECOLOGY

## 2024-04-08 NOTE — PROGRESS NOTES
Maternal Fetal Medicine Follow Up    Subjective:     Patient ID: [id number]    Chief Complaint: M follow up with us       HPI: Em Longoria is a 23 y.o. female  at 34w6d gestation with Estimated Date of Delivery: 24  who is here for follow-up consultation by MFM.    Patient was diagnosed with cholestasis on 3/20/2024 after she had labs for reported generalized itching.  On 24: ALT 19, AST 22, BA pending. She was on ursodiol 500 mg twice daily and Vistaril 25 mg 3 times a day as needed.  She is Rh negative and received RhoGAM on 3/8/2024.  She has GDM and is following a diabetic diet and monitoring her sugars.  She has history of anxiety and is currently on Lexapro 20 mg daily and feeling well.  No acute symptoms at this time.  She had a labile blood pressure reading on 3/27/2024, for which preeclampsia labs were ordered and were reassuring with platelets 263, AST 19, ALT19, uric acid 4.5, creatinine 0.87, .  Follow-up ALT AST are 19 and 22 respectively on 2024 with bile acids pending         Interval history since last M visit: None.. She denies any leaking fluid, vaginal bleeding, contractions, decreased fetal movement. Denies headaches, visual disturbances, or epigastric pain.  .  Pregnancy complications include:   Patient Active Problem List   Diagnosis    Cholestasis during pregnancy in third trimester    Rh negative status during pregnancy in third trimester    Gestational diabetes mellitus (GDM) in third trimester    Anxiety during pregnancy in third trimester, antepartum    Elevated blood pressure affecting pregnancy in third trimester, antepartum       No changes to medical, surgical, family, social, or obstetric history.    Medications:  Current Outpatient Medications   Medication Instructions    blood sugar diagnostic Strp 1 each, Misc.(Non-Drug; Combo Route), 4 times daily    EScitalopram oxalate (LEXAPRO) 20 MG tablet No dose, route, or frequency recorded.    hydrOXYzine  "HCL (ATARAX) 25 mg, Oral, 3 times daily PRN    TRUE METRIX GLUCOSE METER Misc USE AS INSTRUCTED TO CHECK BLOOD GLUCOSSE    TRUEPLUS LANCETS 33 gauge Misc Topical (Top), 4 times daily    ursodioL (ACTIGALL) 500 mg, Oral, 2 times daily    VITAFOL GUMMIES 3.33 mg iron- 0.33 mg Chew 1 tablet, Oral       Review of Systems   12 point review of systems conducted, negative except as stated in the history of present illness. See HPI for details.      Objective:     Visit Vitals  /78 (BP Location: Left arm, Patient Position: Sitting, BP Method: Medium (Automatic))   Pulse 77   Ht 5' 6" (1.676 m)   Wt 68.9 kg (152 lb)   LMP 2023 (Approximate)   BMI 24.53 kg/m²        Physical Exam  Vitals and nursing note reviewed.   Constitutional:       Appearance: Normal appearance.   HENT:      Head: Normocephalic and atraumatic.      Nose: Nose normal. No congestion.   Cardiovascular:      Rate and Rhythm: Normal rate.   Pulmonary:      Effort: Pulmonary effort is normal.   Skin:     Findings: No rash.   Neurological:      Mental Status: She is alert and oriented to person, place, and time.   Psychiatric:         Mood and Affect: Mood normal.         Behavior: Behavior normal.         Thought Content: Thought content normal.         Judgment: Judgment normal.         Assessment/Plan:     23 y.o.  female with IUP at 34w6d    Cholestasis of pregnancy  There is normal fetal growth with an EFW of 2285 g at the 36% and the AC at the 68% on 3/27/2024.  AFV is normal. BPP is 8/8 today.     She will continue ursodiol 500 mg twice daily and Vistaril 25 mg 3 times a day as needed.    Although no solid data or recommendation for  care, I suggest twice weekly  testing till delivery understanding that no treatment is fully protective against fetal demise. I also suggest delivery at 37-38 weeks gestation, depending on the severity of the disease and symptoms.  If bile acids are normal and no significant itching suggest " delivery at 38 (38 weeks-38 weeks 6/7 weeks) weeks' gestation as optimal balance between prematurity risks and risk of continued pregnancy.  Discussed with Dr. Suazo.    There is increasing data to suggest a higher risk of hepatobiliary disease later in life. Patient should have repeated bile acids and LFT 6-8 weeks postpartum, and referral to appropriate specialist should be done if persistent abnormalities. Children of mothers with intrahepatic cholestasis of pregnancy may be at higher risk of increased BMI and dyslipidemia at age of 16 years.        Gestational diabetes mellitus  Reviewed with her risks associated with diabetes in pregnancy including higher risk for polyhydramnios, fetal macrosomia, lower Apgar scores and  metabolic complications (hypoglycemia, hyperbilirubinemia, hypocalcemia, erythema) and developing preeclampsia    Continue 2200 calorie diabetic diet.      Log reviewed.  Continue to check glucose twice daily, once fasting and once alternating meals. Bring log to each visit.      She was advised to continue fetal kick counts 3 times daily, with immediate reporting of decreased fetal movements (<10 movements/hr).    The association of gestational diabetes (50%) with adult-onset type 2 diabetes mellitus was reviewed.  She was advised of importance of losing weight after the pregnancy, as well as doing a 75g 2hr glucose tolerance test after postpartum exam, and checkups every 1-3 years for blood sugars to make sure she does not develop diabetes.        Rh negative, antepartum  Routine administration of antepartum and postpartum anti-D immune globulin (RhoGAM) has reduced the prevalence of D alloimmunization to 0.1 to 0.3 percent of Rh-negative pregnant people. Patients who are Rh-negative should receive RhoGAM injection around 28 weeks and within 72 hours of delivery. Administration of RhoGAM is also recommended when there is an increased risk of fetomaternal bleeding. She was advised to  report any vaginal bleeding during pregnancy to determine whether additional RhoGAM injection is indicated.       Labile blood pressure reading without history of hypertension with normal blood pressure on follow-up and normal preeclampsia labs  BP Readings from Last 1 Encounters:   04/08/24 117/78     Patient is asymptomatic.  Advised her that if blood pressure is persistently elevated it maybe concerning for development of gestational hypertension/preeclampsia. Preeclampsia warnings were  reviewed with instructions to come in immediately if she has any headaches, vision problems, epigastric pain, or decreased fetal movement at any time.        Anxiety during pregnancy, on Lexapro   ACOG recommends that therapy for mental health disorders during pregnancy be individualized. Treatment of anxiety and depression should incorporate the clinical expertise of her mental health clinician, her obstetrician, her primary care provider, and her pediatrician. The risks of untreated depression and the benefits of treatment must be weighed against the risks associated with the use of psychiatric medications during pregnancy.    With symptom control, reasonable to continue Lexapro 20 mg daily at this time. She was also advised to report any worsening of symptoms or SI/HI tendencies immediately to provider/ER for prompt intervention.  She was advised to continue follow up care with her Mental Health provider.    Follow up in about 1 week (around 4/15/2024) for M follow-up, BPP.     Future Appointments   Date Time Provider Department Center   4/11/2024  3:15 PM [facility]   4/15/2024  3:00 PM Theresa Guillen PA-C [facility]   4/15/2024  3:00 PM [facility]OM 1 AND 2[facility]   4/24/2024  8:00 PM INDUCTION, [facility]      Patient was evaluated and examined by Dr. Rodriguez. Kerry Morales, CHET, helped in pre charting of part of  note.      This note was created with the assistance of [employer] voice recognition software. There may be transcription errors as a result of using this technology, however minimal. Effort has been made to ensure accuracy of transcription, but any obvious errors or omissions should be clarified with the author of the document.

## 2024-04-10 ENCOUNTER — TELEPHONE (OUTPATIENT)
Dept: MATERNAL FETAL MEDICINE | Facility: CLINIC | Age: 24
End: 2024-04-10
Payer: MEDICAID

## 2024-04-10 DIAGNOSIS — O16.3 ELEVATED BLOOD PRESSURE AFFECTING PREGNANCY IN THIRD TRIMESTER, ANTEPARTUM: ICD-10-CM

## 2024-04-10 DIAGNOSIS — O24.419 GESTATIONAL DIABETES MELLITUS (GDM) IN THIRD TRIMESTER, GESTATIONAL DIABETES METHOD OF CONTROL UNSPECIFIED: Primary | ICD-10-CM

## 2024-04-10 DIAGNOSIS — O26.643 CHOLESTASIS DURING PREGNANCY IN THIRD TRIMESTER: ICD-10-CM

## 2024-04-10 LAB
BILE AC SERPL-SCNC: 14.33 NMOL/ML
CDCAE SERPL-SCNC: 1.12 NMOL/ML
CHOLATE SERPL-SCNC: 0.8 NMOL/ML
DO-CHOLATE SERPL-SCNC: 1.15 NMOL/ML
URSODEOXYCHOLATE SERPL-SCNC: 11.26 NMOL/ML

## 2024-04-10 NOTE — TELEPHONE ENCOUNTER
----- Message from Theresa Guillen PA-C sent at 4/10/2024  9:26 AM CDT -----  Please notify patient of below:    1. LFTs were normal.  Bile acids were stable at 14.3.  Keep taking ursodiol 500 mg b.i.d. and Vistaril as needed.    Called Pt. No answer, No V/M.

## 2024-04-10 NOTE — TELEPHONE ENCOUNTER
Pt returned call back. Talked with Pt regarding her lab results. Pt's questions were answered. Pt verbalized understanding.

## 2024-04-10 NOTE — PROGRESS NOTES
Please notify patient of below:    1. LFTs were normal.  Bile acids were stable at 14.3.  Keep taking ursodiol 500 mg b.i.d. and Vistaril as needed.

## 2024-04-11 PROCEDURE — 87081 CULTURE SCREEN ONLY: CPT | Performed by: OBSTETRICS & GYNECOLOGY

## 2024-04-15 ENCOUNTER — OFFICE VISIT (OUTPATIENT)
Dept: MATERNAL FETAL MEDICINE | Facility: CLINIC | Age: 24
End: 2024-04-15
Payer: MEDICAID

## 2024-04-15 ENCOUNTER — PROCEDURE VISIT (OUTPATIENT)
Dept: MATERNAL FETAL MEDICINE | Facility: CLINIC | Age: 24
End: 2024-04-15
Payer: MEDICAID

## 2024-04-15 VITALS
DIASTOLIC BLOOD PRESSURE: 85 MMHG | WEIGHT: 154 LBS | HEIGHT: 66 IN | SYSTOLIC BLOOD PRESSURE: 128 MMHG | HEART RATE: 87 BPM | BODY MASS INDEX: 24.75 KG/M2

## 2024-04-15 DIAGNOSIS — O24.419 GESTATIONAL DIABETES MELLITUS (GDM) IN THIRD TRIMESTER, GESTATIONAL DIABETES METHOD OF CONTROL UNSPECIFIED: ICD-10-CM

## 2024-04-15 DIAGNOSIS — O26.893 RH NEGATIVE STATUS DURING PREGNANCY IN THIRD TRIMESTER: ICD-10-CM

## 2024-04-15 DIAGNOSIS — O26.643 CHOLESTASIS DURING PREGNANCY IN THIRD TRIMESTER: ICD-10-CM

## 2024-04-15 DIAGNOSIS — O16.3 ELEVATED BLOOD PRESSURE AFFECTING PREGNANCY IN THIRD TRIMESTER, ANTEPARTUM: ICD-10-CM

## 2024-04-15 DIAGNOSIS — O99.343 ANXIETY DURING PREGNANCY IN THIRD TRIMESTER, ANTEPARTUM: Primary | ICD-10-CM

## 2024-04-15 DIAGNOSIS — F41.9 ANXIETY DURING PREGNANCY IN THIRD TRIMESTER, ANTEPARTUM: Primary | ICD-10-CM

## 2024-04-15 DIAGNOSIS — Z67.91 RH NEGATIVE STATUS DURING PREGNANCY IN THIRD TRIMESTER: ICD-10-CM

## 2024-04-15 PROCEDURE — 3079F DIAST BP 80-89 MM HG: CPT | Mod: CPTII,S$GLB,,

## 2024-04-15 PROCEDURE — 1160F RVW MEDS BY RX/DR IN RCRD: CPT | Mod: CPTII,S$GLB,,

## 2024-04-15 PROCEDURE — 1159F MED LIST DOCD IN RCRD: CPT | Mod: CPTII,S$GLB,,

## 2024-04-15 PROCEDURE — 76819 FETAL BIOPHYS PROFIL W/O NST: CPT | Mod: S$GLB,,, | Performed by: OBSTETRICS & GYNECOLOGY

## 2024-04-15 PROCEDURE — 3074F SYST BP LT 130 MM HG: CPT | Mod: CPTII,S$GLB,,

## 2024-04-15 PROCEDURE — 3008F BODY MASS INDEX DOCD: CPT | Mod: CPTII,S$GLB,,

## 2024-04-15 PROCEDURE — 99213 OFFICE O/P EST LOW 20 MIN: CPT | Mod: TH,S$GLB,,

## 2024-04-15 NOTE — PROGRESS NOTES
Maternal Fetal Medicine Follow Up    Subjective:     Patient ID: [id number]    Chief Complaint: [facility] follow up w/us       HPI: Em Longoria is a 23 y.o. female  at 35w6d gestation with Estimated Date of Delivery: 24  who is here for follow-up consultation by [facility].    Patient was diagnosed with cholestasis on 3/20/2024 after she had labs for reported generalized itching. She was on ursodiol 500 mg twice daily and Vistaril 25 mg 3 times a day as needed. She had follow-up labs on 2024 with ALT 19, AST 22, BA 14.33. She is Rh negative and received RhoGAM on 3/8/2024.  She has GDM and is following a diabetic diet and monitoring her sugars.  She has history of anxiety and is currently on Lexapro 20 mg daily and feeling well.  No acute symptoms at this time.  She had a labile blood pressure reading on 3/27/2024, for which preeclampsia labs were ordered and were reassuring.       Interval history since last [facility] visit: None.. She denies any leaking fluid, vaginal bleeding, contractions, decreased fetal movement. Denies headaches, visual disturbances, or epigastric pain.  .  Pregnancy complications include:   Patient Active Problem List   Diagnosis    Cholestasis during pregnancy in third trimester    Rh negative status during pregnancy in third trimester    Gestational diabetes mellitus (GDM) in third trimester    Anxiety during pregnancy in third trimester, antepartum    Elevated blood pressure affecting pregnancy in third trimester, antepartum       No changes to medical, surgical, family, social, or obstetric history.    Medications:  Current Outpatient Medications   Medication Instructions    blood sugar diagnostic Strp 1 each, Misc.(Non-Drug; Combo Route), 4 times daily    EScitalopram oxalate (LEXAPRO) 20 MG tablet No dose, route, or frequency recorded.    hydrOXYzine HCL (ATARAX) 25 mg, Oral, 3 times daily PRN    TRUE METRIX GLUCOSE METER Misc USE AS INSTRUCTED TO CHECK BLOOD GLUCOSSE    TRUEPLUS LANCETS 33  "gauge Misc Topical (Top), 4 times daily    ursodioL (ACTIGALL) 500 mg, Oral, 2 times daily    VITAFOL GUMMIES 3.33 mg iron- 0.33 mg Chew 1 tablet, Oral       Review of Systems   12 point review of systems conducted, negative except as stated in the history of present illness. See HPI for details.      Objective:     Visit Vitals  /85 (BP Location: Left arm, Patient Position: Sitting, BP Method: Medium (Automatic))   Pulse 87   Ht 5' 6" (1.676 m)   Wt 69.9 kg (154 lb)   LMP 2023 (Approximate)   BMI 24.86 kg/m²        Physical Exam  Vitals and nursing note reviewed.   Constitutional:       Appearance: Normal appearance.   HENT:      Head: Normocephalic and atraumatic.      Nose: Nose normal. No congestion.   Cardiovascular:      Rate and Rhythm: Normal rate.   Pulmonary:      Effort: Pulmonary effort is normal.   Skin:     Findings: No rash.   Neurological:      Mental Status: She is alert and oriented to person, place, and time.   Psychiatric:         Mood and Affect: Mood normal.         Behavior: Behavior normal.         Thought Content: Thought content normal.         Judgment: Judgment normal.         Assessment/Plan:     23 y.o.  female with IUP at 35w6d    Cholestasis of pregnancy  There is normal fetal growth with an EFW of 2285 g at the 36% and the AC at the 68% on 3/27/2024.  AFV is normal. BPP is 8/8 today.     She will continue ursodiol 500 mg twice daily and Vistaril 25 mg 3 times a day as needed.    Although no solid data or recommendation for  care, I suggest twice weekly  testing till delivery understanding that no treatment is fully protective against fetal demise. I also suggest delivery at 37-38 weeks gestation, depending on the severity of the disease and symptoms.  If bile acids are normal and no significant itching suggest delivery at 38 (38 weeks-38 weeks 6/7 weeks) weeks' gestation as optimal balance between prematurity risks and risk of continued pregnancy.  " She has delivery scheduled for 2024.    There is increasing data to suggest a higher risk of hepatobiliary disease later in life. Patient should have repeated bile acids and LFT 6-8 weeks postpartum, and referral to appropriate specialist should be done if persistent abnormalities. Children of mothers with intrahepatic cholestasis of pregnancy may be at higher risk of increased BMI and dyslipidemia at age of 16 years.        Gestational diabetes mellitus  Reviewed with her risks associated with diabetes in pregnancy including higher risk for polyhydramnios, fetal macrosomia, lower Apgar scores and  metabolic complications (hypoglycemia, hyperbilirubinemia, hypocalcemia, erythema) and developing preeclampsia    Continue 2200 calorie diabetic diet.      Log reviewed.  Continue to check glucose twice daily, once fasting and once alternating meals. Bring log to each visit.      She was advised to continue fetal kick counts 3 times daily, with immediate reporting of decreased fetal movements (<10 movements/hr).    The association of gestational diabetes (50%) with adult-onset type 2 diabetes mellitus was reviewed.  She was advised of importance of losing weight after the pregnancy, as well as doing a 75g 2hr glucose tolerance test after postpartum exam, and checkups every 1-3 years for blood sugars to make sure she does not develop diabetes.        Rh negative, antepartum  Routine administration of antepartum and postpartum anti-D immune globulin (RhoGAM) has reduced the prevalence of D alloimmunization to 0.1 to 0.3 percent of Rh-negative pregnant people. Patients who are Rh-negative should receive RhoGAM injection around 28 weeks and within 72 hours of delivery. Administration of RhoGAM is also recommended when there is an increased risk of fetomaternal bleeding. She was advised to report any vaginal bleeding during pregnancy to determine whether additional RhoGAM injection is indicated.       Labile blood  pressure reading with normal blood pressure on follow-up    BP Readings from Last 1 Encounters:   04/15/24 128/85     Patient is asymptomatic.  Advised her that if blood pressure is persistently elevated it maybe concerning for development of gestational hypertension/preeclampsia. Preeclampsia warnings were  reviewed with instructions to come in immediately if she has any headaches, vision problems, epigastric pain, or decreased fetal movement at any time.        Anxiety during pregnancy, on Lexapro   ACOG recommends that therapy for mental health disorders during pregnancy be individualized. Treatment of anxiety and depression should incorporate the clinical expertise of her mental health clinician, her obstetrician, her primary care provider, and her pediatrician. The risks of untreated depression and the benefits of treatment must be weighed against the risks associated with the use of psychiatric medications during pregnancy.    With symptom control, reasonable to continue Lexapro 20 mg daily at this time. She was also advised to report any worsening of symptoms or SI/HI tendencies immediately to provider/ER for prompt intervention.  She was advised to continue follow up care with her Mental Health provider.    Follow up in about 1 week (around 4/22/2024) for MFM follow-up, Repeat ultrasound, BPP.     Future Appointments   Date Time Provider Department Center   4/18/2024  3:15 PM Guero Suazo MD [facility] Ob   4/22/2024  2:45 PM Theresa Guillen PA-C [facility] [place] [facility]   4/22/2024  2:45 PM ROOM 1 AND 2, [facility] [place] [facility]   5/1/2024  8:00 PM INDUCTION, [facility]       Theresa Guillen PA-C     This note was created with the assistance of [employer] voice recognition software. There may be transcription errors as a result of using this technology, however minimal. Effort has been made to ensure accuracy of transcription, but any obvious errors or omissions should  be clarified with the author of the document.

## 2024-04-18 DIAGNOSIS — O24.419 GESTATIONAL DIABETES MELLITUS (GDM) IN THIRD TRIMESTER, GESTATIONAL DIABETES METHOD OF CONTROL UNSPECIFIED: ICD-10-CM

## 2024-04-18 DIAGNOSIS — O16.3 ELEVATED BLOOD PRESSURE AFFECTING PREGNANCY IN THIRD TRIMESTER, ANTEPARTUM: Primary | ICD-10-CM

## 2024-04-18 DIAGNOSIS — O26.643 CHOLESTASIS DURING PREGNANCY IN THIRD TRIMESTER: ICD-10-CM

## 2024-04-20 ENCOUNTER — HOSPITAL ENCOUNTER (EMERGENCY)
Facility: HOSPITAL | Age: 24
Discharge: HOME | End: 2024-04-21
Attending: OBSTETRICS & GYNECOLOGY
Payer: MEDICAID

## 2024-04-20 DIAGNOSIS — O36.8130 DECREASED FETAL MOVEMENTS IN THIRD TRIMESTER, SINGLE OR UNSPECIFIED FETUS: Primary | ICD-10-CM

## 2024-04-20 PROCEDURE — 99284 EMERGENCY DEPT VISIT MOD MDM: CPT

## 2024-04-21 VITALS
OXYGEN SATURATION: 98 % | DIASTOLIC BLOOD PRESSURE: 57 MMHG | HEART RATE: 64 BPM | TEMPERATURE: 98 F | RESPIRATION RATE: 17 BRPM | SYSTOLIC BLOOD PRESSURE: 123 MMHG

## 2024-04-22 ENCOUNTER — PROCEDURE VISIT (OUTPATIENT)
Dept: MATERNAL FETAL MEDICINE | Facility: CLINIC | Age: 24
End: 2024-04-22
Payer: MEDICAID

## 2024-04-22 ENCOUNTER — OFFICE VISIT (OUTPATIENT)
Dept: MATERNAL FETAL MEDICINE | Facility: CLINIC | Age: 24
End: 2024-04-22
Payer: MEDICAID

## 2024-04-22 VITALS
WEIGHT: 152 LBS | BODY MASS INDEX: 24.43 KG/M2 | HEART RATE: 90 BPM | DIASTOLIC BLOOD PRESSURE: 72 MMHG | HEIGHT: 66 IN | SYSTOLIC BLOOD PRESSURE: 129 MMHG

## 2024-04-22 DIAGNOSIS — F41.9 ANXIETY DURING PREGNANCY IN THIRD TRIMESTER, ANTEPARTUM: Primary | ICD-10-CM

## 2024-04-22 DIAGNOSIS — O16.3 ELEVATED BLOOD PRESSURE AFFECTING PREGNANCY IN THIRD TRIMESTER, ANTEPARTUM: ICD-10-CM

## 2024-04-22 DIAGNOSIS — Z67.91 RH NEGATIVE STATUS DURING PREGNANCY IN THIRD TRIMESTER: ICD-10-CM

## 2024-04-22 DIAGNOSIS — O26.893 RH NEGATIVE STATUS DURING PREGNANCY IN THIRD TRIMESTER: ICD-10-CM

## 2024-04-22 DIAGNOSIS — O26.643 CHOLESTASIS DURING PREGNANCY IN THIRD TRIMESTER: ICD-10-CM

## 2024-04-22 DIAGNOSIS — O24.419 GESTATIONAL DIABETES MELLITUS (GDM) IN THIRD TRIMESTER, GESTATIONAL DIABETES METHOD OF CONTROL UNSPECIFIED: ICD-10-CM

## 2024-04-22 DIAGNOSIS — O99.343 ANXIETY DURING PREGNANCY IN THIRD TRIMESTER, ANTEPARTUM: Primary | ICD-10-CM

## 2024-04-22 PROCEDURE — 99213 OFFICE O/P EST LOW 20 MIN: CPT | Mod: TH,S$GLB,,

## 2024-04-22 PROCEDURE — 3078F DIAST BP <80 MM HG: CPT | Mod: CPTII,S$GLB,,

## 2024-04-22 PROCEDURE — 3008F BODY MASS INDEX DOCD: CPT | Mod: CPTII,S$GLB,,

## 2024-04-22 PROCEDURE — 76816 OB US FOLLOW-UP PER FETUS: CPT | Mod: S$GLB,,, | Performed by: OBSTETRICS & GYNECOLOGY

## 2024-04-22 PROCEDURE — 1159F MED LIST DOCD IN RCRD: CPT | Mod: CPTII,S$GLB,,

## 2024-04-22 PROCEDURE — 3074F SYST BP LT 130 MM HG: CPT | Mod: CPTII,S$GLB,,

## 2024-04-22 NOTE — PROGRESS NOTES
Maternal Fetal Medicine Follow Up    Subjective:     Patient ID: [id number]    Chief Complaint: M follow up with US (GDM.    )      HPI: Em Longoria is a 23 y.o. female  at 36w6d gestation with Estimated Date of Delivery: 24  who is here for follow-up consultation by [facility].    Patient was diagnosed with cholestasis on 3/20/2024 after she had labs for reported generalized itching. She was on ursodiol 500 mg twice daily and Vistaril 25 mg 3 times a day as needed. She had follow-up labs on 2024 with ALT 19, AST 22, BA 14.33. She is Rh negative and received RhoGAM on 3/8/2024.  She has GDM and is following a diabetic diet and monitoring her sugars.  She has history of anxiety and is currently on Lexapro 20 mg daily and feeling well.  No acute symptoms at this time.  She had a labile blood pressure reading on 3/27/2024, for which preeclampsia labs were ordered and were reassuring.       Interval history since last [facility] visit: None.. She denies any leaking fluid, vaginal bleeding, contractions, decreased fetal movement. Denies headaches, visual disturbances, or epigastric pain.  .  Pregnancy complications include:   Patient Active Problem List   Diagnosis    Cholestasis during pregnancy in third trimester    Rh negative status during pregnancy in third trimester    Gestational diabetes mellitus (GDM) in third trimester    Anxiety during pregnancy in third trimester, antepartum    Elevated blood pressure affecting pregnancy in third trimester, antepartum       No changes to medical, surgical, family, social, or obstetric history.    Medications:  Current Outpatient Medications   Medication Instructions    blood sugar diagnostic Strp 1 each, Misc.(Non-Drug; Combo Route), 4 times daily    EScitalopram oxalate (LEXAPRO) 20 MG tablet No dose, route, or frequency recorded.    hydrOXYzine HCL (ATARAX) 25 mg, Oral, 3 times daily PRN    TRUE METRIX GLUCOSE METER Misc USE AS INSTRUCTED TO CHECK BLOOD GLUCOSSE  "   TRUEPLUS LANCETS 33 gauge Misc Topical (Top), 4 times daily    ursodioL (ACTIGALL) 500 mg, Oral, 2 times daily    VITAFOL GUMMIES 3.33 mg iron- 0.33 mg Chew 1 tablet, Oral       Review of Systems   12 point review of systems conducted, negative except as stated in the history of present illness. See HPI for details.      Objective:     Visit Vitals  /72 (BP Location: Left arm, Patient Position: Sitting, BP Method: Medium (Automatic))   Pulse 90   Ht 5' 6" (1.676 m)   Wt 68.9 kg (152 lb)   LMP 2023 (Approximate)   BMI 24.53 kg/m²        Physical Exam  Vitals and nursing note reviewed.   Constitutional:       Appearance: Normal appearance.   HENT:      Head: Normocephalic and atraumatic.      Nose: Nose normal. No congestion.   Cardiovascular:      Rate and Rhythm: Normal rate.   Pulmonary:      Effort: Pulmonary effort is normal.   Skin:     Findings: No rash.   Neurological:      Mental Status: She is alert and oriented to person, place, and time.   Psychiatric:         Mood and Affect: Mood normal.         Behavior: Behavior normal.         Thought Content: Thought content normal.         Judgment: Judgment normal.         Assessment/Plan:     23 y.o.  female with IUP at 36w6d    Cholestasis of pregnancy  There is normal fetal growth with an EFW of 2994 g at the 41% and the AC at the 55% on 2024.  AFV is normal. BPP is 8/8 today.     She will continue ursodiol 500 mg twice daily and Vistaril 25 mg 3 times a day as needed.    Although no solid data or recommendation for  care, I suggest twice weekly  testing till delivery understanding that no treatment is fully protective against fetal demise. I also suggest delivery at 37-38 weeks gestation, depending on the severity of the disease and symptoms.  If bile acids are normal and no significant itching suggest delivery at 38 (38 weeks-38 weeks 6/7 weeks) weeks' gestation as optimal balance between prematurity risks and risk of " continued pregnancy.  She has delivery scheduled for 2024.    There is increasing data to suggest a higher risk of hepatobiliary disease later in life. Patient should have repeated bile acids and LFT 6-8 weeks postpartum, and referral to appropriate specialist should be done if persistent abnormalities. Children of mothers with intrahepatic cholestasis of pregnancy may be at higher risk of increased BMI and dyslipidemia at age of 16 years.        Gestational diabetes mellitus  Reviewed with her risks associated with diabetes in pregnancy including higher risk for polyhydramnios, fetal macrosomia, lower Apgar scores and  metabolic complications (hypoglycemia, hyperbilirubinemia, hypocalcemia, erythema) and developing preeclampsia    Continue 2200 calorie diabetic diet.      Log reviewed.  Continue to check glucose twice daily, once fasting and once alternating meals. Bring log to each visit.      She was advised to continue fetal kick counts 3 times daily, with immediate reporting of decreased fetal movements (<10 movements/hr).    The association of gestational diabetes (50%) with adult-onset type 2 diabetes mellitus was reviewed.  She was advised of importance of losing weight after the pregnancy, as well as doing a 75g 2hr glucose tolerance test after postpartum exam, and checkups every 1-3 years for blood sugars to make sure she does not develop diabetes.        Rh negative, antepartum  Routine administration of antepartum and postpartum anti-D immune globulin (RhoGAM) has reduced the prevalence of D alloimmunization to 0.1 to 0.3 percent of Rh-negative pregnant people. Patients who are Rh-negative should receive RhoGAM injection around 28 weeks and within 72 hours of delivery. Administration of RhoGAM is also recommended when there is an increased risk of fetomaternal bleeding. She was advised to report any vaginal bleeding during pregnancy to determine whether additional RhoGAM injection is  indicated.       Labile blood pressure reading with normal blood pressure on follow-up    BP Readings from Last 1 Encounters:   04/22/24 129/72     Patient is asymptomatic.  Advised her that if blood pressure is persistently elevated it maybe concerning for development of gestational hypertension/preeclampsia. Preeclampsia warnings were  reviewed with instructions to come in immediately if she has any headaches, vision problems, epigastric pain, or decreased fetal movement at any time.        Anxiety during pregnancy, on Lexapro   With symptom control, reasonable to continue Lexapro 20 mg daily at this time. She was also advised to report any worsening of symptoms or SI/HI tendencies immediately to provider/ER for prompt intervention.  She was advised to continue follow up care with her Mental Health provider.    Follow up in about 1 week (around 4/29/2024) for [facility] follow-up, BPP.     Future Appointments   Date Time Provider Department Center   4/26/2024  9:00 AM Guero Suazo MD [facility] Ob   4/29/2024  3:00 PM Josse Rodriguez MD [facility]   4/29/2024  3:00 PM ROOM 1 AND 2, [facility]   5/1/2024  8:00 PM INDUCTION, [facility]      Theresa Guillen PA-C     This note was created with the assistance of [employer] voice recognition software. There may be transcription errors as a result of using this technology, however minimal. Effort has been made to ensure accuracy of transcription, but any obvious errors or omissions should be clarified with the author of the document.

## 2024-04-22 NOTE — ED PROVIDER NOTES
Encounter Date: 2024       History     Chief Complaint   Patient presents with    Decreased Fetal Movement     Pt. States last fetal movement felt at 1000 on . Pt reports mucus/discharge upon wiping and report ctxs 2 minutes apart     HPI  Review of patient's allergies indicates:  No Known Allergies  Past Medical History:   Diagnosis Date    Anxiety     Pregnancy 2023     No past surgical history on file.  Family History   Problem Relation Name Age of Onset    Eclampsia Paternal Grandfather      Diabetes Paternal Grandfather       labor Paternal Grandmother      Eclampsia Paternal Grandmother      Stroke Maternal Grandmother      Miscarriages / Stillbirths Maternal Grandmother      Diabetes Maternal Grandmother      Eclampsia Maternal Grandfather       Social History     Tobacco Use    Smoking status: Former     Types: Vaping with nicotine, Cigarettes     Passive exposure: Never    Smokeless tobacco: Never   Substance Use Topics    Alcohol use: Not Currently     Comment: a drink once every so many months    Drug use: Not Currently     Types: Marijuana     Comment: SSRIS Lexapro currently     Review of Systems    Physical Exam     Initial Vitals [24 2337]   BP Pulse Resp Temp SpO2   (!) 140/87 93 17 98.3 °F (36.8 °C) 99 %      MAP       --         Physical Exam    ED Course   Procedures  Labs Reviewed - No data to display       Imaging Results              US OB 14+ Weeks TransAbd, w/Biophysical Profile, w/o NST, Single Gestation (xpd) (Final result)  Result time 24 08:43:24      Final result by Quoc Marshall MD (24 08:43:24)                   Impression:      Biophysical profile 8/8.      Electronically signed by: Quoc Marshall  Date:    2024  Time:    08:43               Narrative:    EXAMINATION:  US OB 14+ WEEKS, TRANSABDOM W/ BIOPHYSICAL PROFILE, W/O NST, SINGLE GESTATION (XPD)    CLINICAL HISTORY:  decreased fetal movement;    FINDINGS:  Presentation is  vertex. Fetal heart rate 147 BPM.  The DONATO is 22 cm. The single deepest pocket is 7 cm.  Placenta is towards the fundus.    2 Fetal Breathing    2 Fetal Movement    2 Fetal Tone    2 Amniotic Fluid    8 Total                        Preliminary result by Nicholas Rogers MD (24 00:40:16)                   Impression:    1. A gravid uterus is present with a single live intrauterine gestation.  2. BPP 8/8.  3. Details and other findings as above.               Narrative:    START OF REPORT:  Technique: 3rd trimester transabdominal ultrasound of the pelvis was performed. Biophysical Profile score also evaluated.    Clinical history: OBED2 decreased fetal movement.    Findings:  Imaging Limitations:  Uterus: A gravid uterus is present with a single live intrauterine gestation.  Presentation: Vertex presentation.  Placenta: Placental position - anterior fundal.  Heart rate: A fetal heart rate of 147 beats per minute is noted.  Fetal Biometry: Amniotic Fluid Index 21.9.  Biophysical Profile: Fetal breathing 2. Fetal movement 2. Fetal tone 2. Amniotic fluid 2. BPP 8/8.                                         Medications - No data to display  Medical Decision Making  Amount and/or Complexity of Data Reviewed  Radiology: ordered.                                      Clinical Impression:   This  @ 36 6/7 weeks gestation presents with complaints of pain and contractions.  Reports +FM, no LOF, no UB, +CTX  Denies any urinary symptoms  Also reports decreased fetal movement    Tracing: category 1  VE: 1/L/P    A/P: Labor ruled out, return to baseline with fetal heart tones  -discharge home  -pain, bleeding, movement precautions  -follow up with Dr. Suazo  -[facility]        ED Disposition Condition    Discharge           ED Prescriptions    None       Follow-up Information       Follow up With Specialties Details Why Contact Info    [facility] - Emergency Dept (OB) Emergency Medicine Follow up As needed, If  symptoms worsen [address] [facility] [id number] [phone]    Guero Suazo MD Obstetrics and Gynecology Follow up As scheduled [address] [place] [address]  [address]  [facility] [address]  [phone]               Octavio Velazco MD  04/22/24 1637       Octavio Velazco MD  04/22/24 1640

## 2024-04-25 DIAGNOSIS — O16.3 ELEVATED BLOOD PRESSURE AFFECTING PREGNANCY IN THIRD TRIMESTER, ANTEPARTUM: Primary | ICD-10-CM

## 2024-04-26 ENCOUNTER — HOSPITAL ENCOUNTER (INPATIENT)
Facility: HOSPITAL | Age: 24
LOS: 4 days | Discharge: HOME | End: 2024-04-30
Attending: OBSTETRICS & GYNECOLOGY | Admitting: OBSTETRICS & GYNECOLOGY
Payer: MEDICAID

## 2024-04-26 DIAGNOSIS — Z34.90 ENCOUNTER FOR INDUCTION OF LABOR: Primary | ICD-10-CM

## 2024-04-26 LAB
ANTIBODY IDENTIFICATION: NORMAL
BASOPHILS # BLD AUTO: 0.03 X10(3)/MCL
BASOPHILS NFR BLD AUTO: 0.3 %
C TRACH DNA SPEC QL NAA+PROBE: NOT DETECTED
EOSINOPHIL # BLD AUTO: 0.12 X10(3)/MCL (ref 0–0.9)
EOSINOPHIL NFR BLD AUTO: 1.2 %
ERYTHROCYTE [DISTWIDTH] IN BLOOD BY AUTOMATED COUNT: 13.9 % (ref 11.5–17)
GROUP & RH: ABNORMAL
HCT VFR BLD AUTO: 38.3 % (ref 37–47)
HGB BLD-MCNC: 12.5 G/DL (ref 12–16)
IMM GRANULOCYTES # BLD AUTO: 0.06 X10(3)/MCL (ref 0–0.04)
IMM GRANULOCYTES NFR BLD AUTO: 0.6 %
INDIRECT COOMBS: ABNORMAL
LYMPHOCYTES # BLD AUTO: 1.46 X10(3)/MCL (ref 0.6–4.6)
LYMPHOCYTES NFR BLD AUTO: 14.2 %
MCH RBC QN AUTO: 28.9 PG (ref 27–31)
MCHC RBC AUTO-ENTMCNC: 32.6 G/DL (ref 33–36)
MCV RBC AUTO: 88.7 FL (ref 80–94)
MONOCYTES # BLD AUTO: 0.68 X10(3)/MCL (ref 0.1–1.3)
MONOCYTES NFR BLD AUTO: 6.6 %
N GONORRHOEA DNA SPEC QL NAA+PROBE: NOT DETECTED
NEUTROPHILS # BLD AUTO: 7.96 X10(3)/MCL (ref 2.1–9.2)
NEUTROPHILS NFR BLD AUTO: 77.1 %
NRBC BLD AUTO-RTO: 0 %
PLATELET # BLD AUTO: 220 X10(3)/MCL (ref 130–400)
PMV BLD AUTO: 10 FL (ref 7.4–10.4)
POCT GLUCOSE: 76 MG/DL (ref 70–110)
POCT GLUCOSE: 98 MG/DL (ref 70–110)
RBC # BLD AUTO: 4.32 X10(6)/MCL (ref 4.2–5.4)
SOURCE (OHS): NORMAL
SPECIMEN OUTDATE: ABNORMAL
T PALLIDUM AB SER QL: NONREACTIVE
WBC # SPEC AUTO: 10.31 X10(3)/MCL (ref 4.5–11.5)

## 2024-04-26 PROCEDURE — 86870 RBC ANTIBODY IDENTIFICATION: CPT | Performed by: OBSTETRICS & GYNECOLOGY

## 2024-04-26 PROCEDURE — 85025 COMPLETE CBC W/AUTO DIFF WBC: CPT | Performed by: OBSTETRICS & GYNECOLOGY

## 2024-04-26 PROCEDURE — 94760 N-INVAS EAR/PLS OXIMETRY 1: CPT

## 2024-04-26 PROCEDURE — 63600175 PHARM REV CODE 636 W HCPCS: Performed by: OBSTETRICS & GYNECOLOGY

## 2024-04-26 PROCEDURE — 11000001 HC ACUTE MED/SURG PRIVATE ROOM

## 2024-04-26 PROCEDURE — 86850 RBC ANTIBODY SCREEN: CPT | Performed by: OBSTETRICS & GYNECOLOGY

## 2024-04-26 PROCEDURE — 86780 TREPONEMA PALLIDUM: CPT | Performed by: OBSTETRICS & GYNECOLOGY

## 2024-04-26 PROCEDURE — 94761 N-INVAS EAR/PLS OXIMETRY MLT: CPT

## 2024-04-26 PROCEDURE — 87491 CHLMYD TRACH DNA AMP PROBE: CPT | Performed by: OBSTETRICS & GYNECOLOGY

## 2024-04-26 PROCEDURE — 25000003 PHARM REV CODE 250: Performed by: OBSTETRICS & GYNECOLOGY

## 2024-04-26 RX ORDER — CARBOPROST TROMETHAMINE 250 UG/ML
250 INJECTION, SOLUTION INTRAMUSCULAR
Status: DISCONTINUED | OUTPATIENT
Start: 2024-04-26 | End: 2024-04-28

## 2024-04-26 RX ORDER — LIDOCAINE HYDROCHLORIDE 10 MG/ML
10 INJECTION INFILTRATION; PERINEURAL ONCE AS NEEDED
Status: DISCONTINUED | OUTPATIENT
Start: 2024-04-26 | End: 2024-04-30 | Stop reason: HOSPADM

## 2024-04-26 RX ORDER — MISOPROSTOL 100 UG/1
800 TABLET ORAL ONCE AS NEEDED
Status: DISCONTINUED | OUTPATIENT
Start: 2024-04-26 | End: 2024-04-30 | Stop reason: HOSPADM

## 2024-04-26 RX ORDER — HYDROXYZINE PAMOATE 50 MG/1
50 CAPSULE ORAL EVERY 6 HOURS PRN
Status: DISCONTINUED | OUTPATIENT
Start: 2024-04-26 | End: 2024-04-30 | Stop reason: HOSPADM

## 2024-04-26 RX ORDER — ACETAMINOPHEN 325 MG/1
650 TABLET ORAL EVERY 6 HOURS PRN
Status: DISCONTINUED | OUTPATIENT
Start: 2024-04-26 | End: 2024-04-28

## 2024-04-26 RX ORDER — OXYTOCIN/RINGER'S LACTATE 30/500 ML
95 PLASTIC BAG, INJECTION (ML) INTRAVENOUS ONCE
Status: DISCONTINUED | OUTPATIENT
Start: 2024-04-26 | End: 2024-04-30 | Stop reason: HOSPADM

## 2024-04-26 RX ORDER — SIMETHICONE 80 MG
1 TABLET,CHEWABLE ORAL 4 TIMES DAILY PRN
Status: DISCONTINUED | OUTPATIENT
Start: 2024-04-26 | End: 2024-04-30 | Stop reason: HOSPADM

## 2024-04-26 RX ORDER — CALCIUM CARBONATE 200(500)MG
500 TABLET,CHEWABLE ORAL 3 TIMES DAILY PRN
Status: DISCONTINUED | OUTPATIENT
Start: 2024-04-26 | End: 2024-04-30 | Stop reason: HOSPADM

## 2024-04-26 RX ORDER — OXYTOCIN 10 [USP'U]/ML
10 INJECTION, SOLUTION INTRAMUSCULAR; INTRAVENOUS ONCE AS NEEDED
Status: DISCONTINUED | OUTPATIENT
Start: 2024-04-26 | End: 2024-04-28

## 2024-04-26 RX ORDER — METHYLERGONOVINE MALEATE 0.2 MG/ML
200 INJECTION INTRAVENOUS ONCE AS NEEDED
Status: DISCONTINUED | OUTPATIENT
Start: 2024-04-26 | End: 2024-04-30 | Stop reason: HOSPADM

## 2024-04-26 RX ORDER — DIPHENOXYLATE HYDROCHLORIDE AND ATROPINE SULFATE 2.5; .025 MG/1; MG/1
2 TABLET ORAL EVERY 6 HOURS PRN
Status: DISCONTINUED | OUTPATIENT
Start: 2024-04-26 | End: 2024-04-28

## 2024-04-26 RX ORDER — OXYTOCIN/RINGER'S LACTATE 30/500 ML
334 PLASTIC BAG, INJECTION (ML) INTRAVENOUS ONCE
Status: COMPLETED | OUTPATIENT
Start: 2024-04-26 | End: 2024-04-28

## 2024-04-26 RX ORDER — BUTORPHANOL TARTRATE 2 MG/ML
1 INJECTION INTRAMUSCULAR; INTRAVENOUS
Status: DISCONTINUED | OUTPATIENT
Start: 2024-04-26 | End: 2024-04-30 | Stop reason: HOSPADM

## 2024-04-26 RX ORDER — OXYTOCIN/RINGER'S LACTATE 30/500 ML
0-30 PLASTIC BAG, INJECTION (ML) INTRAVENOUS CONTINUOUS
Status: DISCONTINUED | OUTPATIENT
Start: 2024-04-27 | End: 2024-04-30 | Stop reason: HOSPADM

## 2024-04-26 RX ORDER — OXYTOCIN/RINGER'S LACTATE 30/500 ML
334 PLASTIC BAG, INJECTION (ML) INTRAVENOUS ONCE AS NEEDED
Status: DISCONTINUED | OUTPATIENT
Start: 2024-04-26 | End: 2024-04-28

## 2024-04-26 RX ORDER — OXYTOCIN/RINGER'S LACTATE 30/500 ML
95 PLASTIC BAG, INJECTION (ML) INTRAVENOUS ONCE AS NEEDED
Status: DISCONTINUED | OUTPATIENT
Start: 2024-04-26 | End: 2024-04-28

## 2024-04-26 RX ORDER — ONDANSETRON 4 MG/1
8 TABLET, ORALLY DISINTEGRATING ORAL EVERY 8 HOURS PRN
Status: DISCONTINUED | OUTPATIENT
Start: 2024-04-26 | End: 2024-04-28

## 2024-04-26 RX ORDER — SODIUM CHLORIDE, SODIUM LACTATE, POTASSIUM CHLORIDE, CALCIUM CHLORIDE 600; 310; 30; 20 MG/100ML; MG/100ML; MG/100ML; MG/100ML
INJECTION, SOLUTION INTRAVENOUS CONTINUOUS
Status: DISCONTINUED | OUTPATIENT
Start: 2024-04-26 | End: 2024-04-30 | Stop reason: HOSPADM

## 2024-04-26 RX ADMIN — SODIUM CHLORIDE, POTASSIUM CHLORIDE, SODIUM LACTATE AND CALCIUM CHLORIDE: 600; 310; 30; 20 INJECTION, SOLUTION INTRAVENOUS at 06:04

## 2024-04-26 RX ADMIN — DINOPROSTONE 10 MG: 10 INSERT VAGINAL at 01:04

## 2024-04-26 RX ADMIN — SODIUM CHLORIDE, POTASSIUM CHLORIDE, SODIUM LACTATE AND CALCIUM CHLORIDE: 600; 310; 30; 20 INJECTION, SOLUTION INTRAVENOUS at 10:04

## 2024-04-27 ENCOUNTER — ANESTHESIA EVENT (OUTPATIENT)
Dept: OBSTETRICS AND GYNECOLOGY | Facility: HOSPITAL | Age: 24
End: 2024-04-27
Payer: MEDICAID

## 2024-04-27 ENCOUNTER — ANESTHESIA (OUTPATIENT)
Dept: OBSTETRICS AND GYNECOLOGY | Facility: HOSPITAL | Age: 24
End: 2024-04-27
Payer: MEDICAID

## 2024-04-27 LAB
GLUCOSE SERPL-MCNC: 98 MG/DL (ref 70–110)
GLUCOSE SERPL-MCNC: 98 MG/DL (ref 70–110)
POCT GLUCOSE: 74 MG/DL (ref 70–110)
POCT GLUCOSE: 77 MG/DL (ref 70–110)
POCT GLUCOSE: 84 MG/DL (ref 70–110)

## 2024-04-27 PROCEDURE — 63600175 PHARM REV CODE 636 W HCPCS: Performed by: OBSTETRICS & GYNECOLOGY

## 2024-04-27 PROCEDURE — 25000003 PHARM REV CODE 250: Performed by: STUDENT IN AN ORGANIZED HEALTH CARE EDUCATION/TRAINING PROGRAM

## 2024-04-27 PROCEDURE — 62326 NJX INTERLAMINAR LMBR/SAC: CPT | Performed by: STUDENT IN AN ORGANIZED HEALTH CARE EDUCATION/TRAINING PROGRAM

## 2024-04-27 PROCEDURE — 51702 INSERT TEMP BLADDER CATH: CPT

## 2024-04-27 PROCEDURE — 63600175 PHARM REV CODE 636 W HCPCS

## 2024-04-27 PROCEDURE — 59409 OBSTETRICAL CARE: CPT | Mod: AA,,, | Performed by: STUDENT IN AN ORGANIZED HEALTH CARE EDUCATION/TRAINING PROGRAM

## 2024-04-27 PROCEDURE — 11000001 HC ACUTE MED/SURG PRIVATE ROOM

## 2024-04-27 PROCEDURE — 25000003 PHARM REV CODE 250: Performed by: OBSTETRICS & GYNECOLOGY

## 2024-04-27 RX ORDER — EPHEDRINE SULFATE 50 MG/ML
10 INJECTION, SOLUTION INTRAVENOUS ONCE AS NEEDED
Status: COMPLETED | OUTPATIENT
Start: 2024-04-27 | End: 2024-04-27

## 2024-04-27 RX ORDER — FENTANYL/BUPIVACAINE/NS/PF 2-1250MCG
PLASTIC BAG, INJECTION (ML) INJECTION CONTINUOUS
Status: DISCONTINUED | OUTPATIENT
Start: 2024-04-27 | End: 2024-04-30 | Stop reason: HOSPADM

## 2024-04-27 RX ORDER — LIDOCAINE HYDROCHLORIDE AND EPINEPHRINE 15; 5 MG/ML; UG/ML
INJECTION, SOLUTION EPIDURAL
Status: DISCONTINUED | OUTPATIENT
Start: 2024-04-27 | End: 2024-04-28

## 2024-04-27 RX ORDER — BUPIVACAINE HYDROCHLORIDE 2.5 MG/ML
INJECTION, SOLUTION INFILTRATION; PERINEURAL
Status: DISCONTINUED | OUTPATIENT
Start: 2024-04-27 | End: 2024-04-28

## 2024-04-27 RX ORDER — FENTANYL CITRATE 50 UG/ML
INJECTION, SOLUTION INTRAMUSCULAR; INTRAVENOUS
Status: DISCONTINUED | OUTPATIENT
Start: 2024-04-27 | End: 2024-04-28

## 2024-04-27 RX ADMIN — EPHEDRINE SULFATE 10 MG: 50 INJECTION INTRAVENOUS at 06:04

## 2024-04-27 RX ADMIN — SODIUM CHLORIDE, POTASSIUM CHLORIDE, SODIUM LACTATE AND CALCIUM CHLORIDE: 600; 310; 30; 20 INJECTION, SOLUTION INTRAVENOUS at 02:04

## 2024-04-27 RX ADMIN — SODIUM CHLORIDE, POTASSIUM CHLORIDE, SODIUM LACTATE AND CALCIUM CHLORIDE 1000 ML: 600; 310; 30; 20 INJECTION, SOLUTION INTRAVENOUS at 04:04

## 2024-04-27 RX ADMIN — Medication 2 MILLI-UNITS/MIN: at 02:04

## 2024-04-27 RX ADMIN — FENTANYL CITRATE 100 MCG: 50 INJECTION, SOLUTION INTRAMUSCULAR; INTRAVENOUS at 08:04

## 2024-04-27 RX ADMIN — BUPIVACAINE HYDROCHLORIDE 5 ML: 2.5 INJECTION, SOLUTION INFILTRATION; PERINEURAL at 04:04

## 2024-04-27 RX ADMIN — ACETAMINOPHEN 650 MG: 325 TABLET, FILM COATED ORAL at 02:04

## 2024-04-27 RX ADMIN — SODIUM CHLORIDE, POTASSIUM CHLORIDE, SODIUM LACTATE AND CALCIUM CHLORIDE: 600; 310; 30; 20 INJECTION, SOLUTION INTRAVENOUS at 11:04

## 2024-04-27 RX ADMIN — BUPIVACAINE HYDROCHLORIDE 5 ML: 2.5 INJECTION, SOLUTION INFILTRATION; PERINEURAL at 08:04

## 2024-04-27 RX ADMIN — Medication 10 ML/HR: at 04:04

## 2024-04-27 RX ADMIN — SODIUM CHLORIDE, POTASSIUM CHLORIDE, SODIUM LACTATE AND CALCIUM CHLORIDE: 600; 310; 30; 20 INJECTION, SOLUTION INTRAVENOUS at 04:04

## 2024-04-27 RX ADMIN — LIDOCAINE HYDROCHLORIDE,EPINEPHRINE BITARTRATE 5 ML: 15; .005 INJECTION, SOLUTION EPIDURAL; INFILTRATION; INTRACAUDAL; PERINEURAL at 04:04

## 2024-04-27 NOTE — ANESTHESIA PREPROCEDURE EVALUATION
04/27/2024  Em Longoria is a 23 y.o., female.      Pre-op Assessment    I have reviewed the Patient Summary Reports.     I have reviewed the Nursing Notes. I have reviewed the NPO Status.   I have reviewed the Medications.     Review of Systems  Anesthesia Hx:               Denies Personal Hx of Anesthesia complications.                    Cardiovascular:  Exercise tolerance: good                                           Pulmonary:  Pulmonary Normal                       Hepatic/GI:      Liver Disease, (cholestasis)            Endocrine:  Endocrine Normal            Psych:  Psychiatric History anxiety                 Physical Exam  General: Well nourished, Cooperative and Alert    Airway:  Mallampati: II         Anesthesia Plan  Type of Anesthesia, risks & benefits discussed:    Anesthesia Type: Epidural  Intra-op Monitoring Plan: Standard ASA Monitors  Post Op Pain Control Plan: epidural analgesia  Induction:  IV  Informed Consent: Informed consent signed with the Patient and all parties understand the risks and agree with anesthesia plan.  All questions answered.   ASA Score: 2  Day of Surgery Review of History & Physical: H&P Update referred to the surgeon/provider.    Ready For Surgery From Anesthesia Perspective.     .

## 2024-04-27 NOTE — ANESTHESIA PROCEDURE NOTES
Epidural    Patient location during procedure: OB   Reason for block: primary anesthetic   Reason for block: labor analgesia requested by patient and obstetrician  Diagnosis: IUP   Start time: 4/27/2024 4:31 PM  Timeout: 4/27/2024 4:31 PM  End time: 4/27/2024 4:45 PM    Staffing  Performing Provider: Eulalio Wallace MD  Authorizing Provider: Eulalio Wallace MD    Staffing  Performed by: Eulalio Wallace MD  Authorized by: Eulalio Wallace MD        Preanesthetic Checklist  Completed: patient identified, IV checked, site marked, risks and benefits discussed, surgical consent, monitors and equipment checked, pre-op evaluation, timeout performed, anesthesia consent given, hand hygiene performed and patient being monitored  Preparation  Patient position: sitting  Prep: ChloraPrep  Patient monitoring: Pulse Ox  Reason for block: primary anesthetic   Epidural  Skin Anesthetic: lidocaine 1%  Administration type: continuous  Approach: midline  Interspace: L3-4    Injection technique: BJORN saline  Needle and Epidural Catheter  Needle type: Tuohy   Needle gauge: 17  Needle insertion depth: 4 cm  Catheter type: springwound  Catheter size: 19 G  Catheter at skin depth: 9 cm  Insertion Attempts: 1  Test dose: 5 mL of lidocaine 1.5% with Epi 1-to-200,000  Additional Documentation: no paresthesia on injection, no significant pain on injection, no signs/symptoms of IV or SA injection, no significant complaints from patient and negative aspiration for heme and CSF  Needle localization: anatomical landmarks  Assessment  Ease of block: easy  Patient's tolerance of the procedure: comfortable throughout block  Additional Notes  Straightforward epidural, crisp BJORN, easy threading of catheter, negative SA/IV test dose    Madison SALCEDO      No inadvertent dural puncture with Tuohy.  Dural puncture not performed with spinal needle

## 2024-04-27 NOTE — H&P
HISTORY AND PHYSICAL                                                OBSTETRICS        Chief Complaint: Labor induction     Subjective:      Em Longoria is a 23 y.o.  female with IUP at 37w4d gestation who presents to L&D for induction of labor.  Patient denies vaginal bleeding, leaking of fluid, or regular contractions.    Although she does have cholestatis of pregnancy, the patient's pregnancy has been uncomplicated thus far.  Care this pregnancy has been with Dr. Suazo.      See antepartum record for details.      PMHx:   Past Medical History:   Diagnosis Date    Anxiety 2020    Cholestasis of pregnancy 2024    Gestational diabetes mellitus (GDM) affecting first pregnancy     Pregnancy 2023       PSHx: No past surgical history on file.    All: Review of patient's allergies indicates:  No Known Allergies    Meds:   Medications Prior to Admission   Medication Sig Dispense Refill Last Dose    blood sugar diagnostic Strp 1 each by Misc.(Non-Drug; Combo Route) route 4 (four) times daily. 200 each 4 2024    EScitalopram oxalate (LEXAPRO) 20 MG tablet Take 20 mg by mouth once daily.   2024 at 1300    ursodioL (ACTIGALL) 500 MG tablet Take 1 tablet (500 mg total) by mouth 2 (two) times daily. 60 tablet 3 2024 at 2100    VITAFOL GUMMIES 3.33 mg iron- 0.33 mg Chew Take 1 tablet by mouth once daily.   2024 at 1200    hydrOXYzine HCL (ATARAX) 25 MG tablet Take 1 tablet (25 mg total) by mouth 3 (three) times daily as needed for Itching. 60 tablet 2     TRUE METRIX GLUCOSE METER Misc USE AS INSTRUCTED TO CHECK BLOOD GLUCOSSE       TRUEPLUS LANCETS 33 gauge Misc Apply topically 4 (four) times daily.          SH:   Social History     Socioeconomic History    Marital status:    Tobacco Use    Smoking status: Former     Types: Vaping with nicotine, Cigarettes     Passive exposure: Never    Smokeless tobacco: Never   Substance and Sexual Activity    Alcohol use: Not Currently      Comment: a drink once every so many months    Drug use: Not Currently     Types: Marijuana     Comment: SSRIS Lexapro currently    Sexual activity: Yes     Partners: Male     Birth control/protection: None       FH:   Family History   Problem Relation Name Age of Onset    Eclampsia Paternal Grandfather      Diabetes Paternal Grandfather       labor Paternal Grandmother      Eclampsia Paternal Grandmother      Stroke Maternal Grandmother      Miscarriages / Stillbirths Maternal Grandmother      Diabetes Maternal Grandmother      Eclampsia Maternal Grandfather         OBHx:   OB History    Para Term  AB Living   1 0 0 0 0 0   SAB IAB Ectopic Multiple Live Births   0 0 0 0 0      # Outcome Date GA Lbr Brian/2nd Weight Sex Type Anes PTL Lv   1 Current                Objective:      [unfilled]  [unfilled]  BMI Readings from Last 1 Encounters:   24 24.86 kg/m²         General:   alert and cooperative   HEENT:  normocephalic, atraumatic   Lungs:   Normal work of breathing   Heart:   Normal cap refill   Abdomen:  gravid, non-tender   Extremities non-tender, no edema   Derm: no rashes or lesions   Psych: appropriate mood and affect   Pelvis:  adequate       Cervix:     Dilation: 0 cm    Effacement: 50%    Station:  -3     Vertex presentation by palpation    Lab Review  Prenatal Labs:  Lab Results   Component Value Date    GROUPTRH O NEG 2024    INDCOGEL POS (A) 2024    HGB 12.5 2024    HCT 38.3 2024     2024    RUBELLAIMMUN Immune 2023    HEPBSAG Negative 2023    YJI18LXXR Negative 2023    LABURIN No Significant Growth 2023    STREPBCULT Negative 04/15/2024        Assessment:     23 y.o.  at 37w4d gestation   Cholestasis of pregnancy  here for induction of labor     Plan:     1. Risks, benefits, alternatives and possible complications of delivery, possible , possible blood transfusion, possible operative  vaginal delivery have been discussed in detail with the patient. All questions have been answered, and Ms. Longoria has voiced understanding and agrees to the treatment plan.  2. Admit to Labor and Delivery unit for cervical ripening, then labor induction with Pitocin.

## 2024-04-28 PROCEDURE — 25000003 PHARM REV CODE 250: Performed by: OBSTETRICS & GYNECOLOGY

## 2024-04-28 PROCEDURE — 10907ZC DRAINAGE OF AMNIOTIC FLUID, THERAPEUTIC FROM PRODUCTS OF CONCEPTION, VIA NATURAL OR ARTIFICIAL OPENING: ICD-10-PCS | Performed by: OBSTETRICS & GYNECOLOGY

## 2024-04-28 PROCEDURE — 63600175 PHARM REV CODE 636 W HCPCS: Performed by: OBSTETRICS & GYNECOLOGY

## 2024-04-28 PROCEDURE — 3E0P7VZ INTRODUCTION OF HORMONE INTO FEMALE REPRODUCTIVE, VIA NATURAL OR ARTIFICIAL OPENING: ICD-10-PCS | Performed by: OBSTETRICS & GYNECOLOGY

## 2024-04-28 PROCEDURE — 72100002 HC LABOR CARE, 1ST 8 HOURS

## 2024-04-28 PROCEDURE — 11000001 HC ACUTE MED/SURG PRIVATE ROOM

## 2024-04-28 PROCEDURE — 51701 INSERT BLADDER CATHETER: CPT

## 2024-04-28 PROCEDURE — 86922 COMPATIBILITY TEST ANTIGLOB: CPT | Performed by: OBSTETRICS & GYNECOLOGY

## 2024-04-28 PROCEDURE — 72100003 HC LABOR CARE, EA. ADDL. 8 HRS

## 2024-04-28 PROCEDURE — 72200006 HC VAGINAL DELIVERY LEVEL III

## 2024-04-28 PROCEDURE — 0KQM0ZZ REPAIR PERINEUM MUSCLE, OPEN APPROACH: ICD-10-PCS | Performed by: OBSTETRICS & GYNECOLOGY

## 2024-04-28 RX ORDER — DIPHENHYDRAMINE HCL 25 MG
25 CAPSULE ORAL EVERY 4 HOURS PRN
Status: DISCONTINUED | OUTPATIENT
Start: 2024-04-28 | End: 2024-04-30 | Stop reason: HOSPADM

## 2024-04-28 RX ORDER — DIPHENOXYLATE HYDROCHLORIDE AND ATROPINE SULFATE 2.5; .025 MG/1; MG/1
2 TABLET ORAL EVERY 6 HOURS PRN
Status: DISCONTINUED | OUTPATIENT
Start: 2024-04-28 | End: 2024-04-30 | Stop reason: HOSPADM

## 2024-04-28 RX ORDER — OXYTOCIN/RINGER'S LACTATE 30/500 ML
95 PLASTIC BAG, INJECTION (ML) INTRAVENOUS ONCE AS NEEDED
Status: DISCONTINUED | OUTPATIENT
Start: 2024-04-28 | End: 2024-04-28

## 2024-04-28 RX ORDER — OXYTOCIN/RINGER'S LACTATE 30/500 ML
334 PLASTIC BAG, INJECTION (ML) INTRAVENOUS ONCE AS NEEDED
Status: COMPLETED | OUTPATIENT
Start: 2024-04-28 | End: 2024-04-28

## 2024-04-28 RX ORDER — SIMETHICONE 80 MG
1 TABLET,CHEWABLE ORAL EVERY 6 HOURS PRN
Status: DISCONTINUED | OUTPATIENT
Start: 2024-04-28 | End: 2024-04-30 | Stop reason: HOSPADM

## 2024-04-28 RX ORDER — MISOPROSTOL 100 UG/1
800 TABLET ORAL ONCE AS NEEDED
Status: DISCONTINUED | OUTPATIENT
Start: 2024-04-28 | End: 2024-04-30 | Stop reason: HOSPADM

## 2024-04-28 RX ORDER — HYDROCODONE BITARTRATE AND ACETAMINOPHEN 500; 5 MG/1; MG/1
TABLET ORAL
Status: DISCONTINUED | OUTPATIENT
Start: 2024-04-28 | End: 2024-04-30 | Stop reason: HOSPADM

## 2024-04-28 RX ORDER — ACETAMINOPHEN 325 MG/1
650 TABLET ORAL EVERY 6 HOURS PRN
Status: DISCONTINUED | OUTPATIENT
Start: 2024-04-28 | End: 2024-04-30 | Stop reason: HOSPADM

## 2024-04-28 RX ORDER — ONDANSETRON 4 MG/1
8 TABLET, ORALLY DISINTEGRATING ORAL EVERY 8 HOURS PRN
Status: DISCONTINUED | OUTPATIENT
Start: 2024-04-28 | End: 2024-04-30 | Stop reason: HOSPADM

## 2024-04-28 RX ORDER — ONDANSETRON HYDROCHLORIDE 2 MG/ML
4 INJECTION, SOLUTION INTRAVENOUS ONCE
Status: COMPLETED | OUTPATIENT
Start: 2024-04-28 | End: 2024-04-28

## 2024-04-28 RX ORDER — HYDROCORTISONE 25 MG/G
CREAM TOPICAL 3 TIMES DAILY PRN
Status: DISCONTINUED | OUTPATIENT
Start: 2024-04-28 | End: 2024-04-30 | Stop reason: HOSPADM

## 2024-04-28 RX ORDER — CARBOPROST TROMETHAMINE 250 UG/ML
250 INJECTION, SOLUTION INTRAMUSCULAR
Status: DISCONTINUED | OUTPATIENT
Start: 2024-04-28 | End: 2024-04-30 | Stop reason: HOSPADM

## 2024-04-28 RX ORDER — PRENATAL WITH FERROUS FUM AND FOLIC ACID 3080; 920; 120; 400; 22; 1.84; 3; 20; 10; 1; 12; 200; 27; 25; 2 [IU]/1; [IU]/1; MG/1; [IU]/1; MG/1; MG/1; MG/1; MG/1; MG/1; MG/1; UG/1; MG/1; MG/1; MG/1; MG/1
1 TABLET ORAL DAILY
Status: DISCONTINUED | OUTPATIENT
Start: 2024-04-28 | End: 2024-04-30 | Stop reason: HOSPADM

## 2024-04-28 RX ORDER — IBUPROFEN 600 MG/1
600 TABLET ORAL EVERY 6 HOURS PRN
Status: DISCONTINUED | OUTPATIENT
Start: 2024-04-28 | End: 2024-04-30 | Stop reason: HOSPADM

## 2024-04-28 RX ORDER — OXYTOCIN/RINGER'S LACTATE 30/500 ML
95 PLASTIC BAG, INJECTION (ML) INTRAVENOUS ONCE
Status: DISCONTINUED | OUTPATIENT
Start: 2024-04-28 | End: 2024-04-30 | Stop reason: HOSPADM

## 2024-04-28 RX ORDER — HYDROCODONE BITARTRATE AND ACETAMINOPHEN 10; 325 MG/1; MG/1
1 TABLET ORAL EVERY 4 HOURS PRN
Status: DISCONTINUED | OUTPATIENT
Start: 2024-04-28 | End: 2024-04-30 | Stop reason: HOSPADM

## 2024-04-28 RX ORDER — DIPHENHYDRAMINE HYDROCHLORIDE 50 MG/ML
25 INJECTION INTRAMUSCULAR; INTRAVENOUS EVERY 4 HOURS PRN
Status: DISCONTINUED | OUTPATIENT
Start: 2024-04-28 | End: 2024-04-30 | Stop reason: HOSPADM

## 2024-04-28 RX ORDER — DOCUSATE SODIUM 100 MG/1
200 CAPSULE, LIQUID FILLED ORAL 2 TIMES DAILY PRN
Status: DISCONTINUED | OUTPATIENT
Start: 2024-04-28 | End: 2024-04-30 | Stop reason: HOSPADM

## 2024-04-28 RX ORDER — OXYTOCIN 10 [USP'U]/ML
10 INJECTION, SOLUTION INTRAMUSCULAR; INTRAVENOUS ONCE AS NEEDED
Status: DISCONTINUED | OUTPATIENT
Start: 2024-04-28 | End: 2024-04-30 | Stop reason: HOSPADM

## 2024-04-28 RX ORDER — HYDROCODONE BITARTRATE AND ACETAMINOPHEN 5; 325 MG/1; MG/1
1 TABLET ORAL EVERY 4 HOURS PRN
Status: DISCONTINUED | OUTPATIENT
Start: 2024-04-28 | End: 2024-04-30 | Stop reason: HOSPADM

## 2024-04-28 RX ORDER — METHYLERGONOVINE MALEATE 0.2 MG/ML
200 INJECTION INTRAVENOUS ONCE AS NEEDED
Status: DISCONTINUED | OUTPATIENT
Start: 2024-04-28 | End: 2024-04-30 | Stop reason: HOSPADM

## 2024-04-28 RX ADMIN — IBUPROFEN 600 MG: 600 TABLET, FILM COATED ORAL at 09:04

## 2024-04-28 RX ADMIN — IBUPROFEN 600 MG: 600 TABLET, FILM COATED ORAL at 08:04

## 2024-04-28 RX ADMIN — Medication 334 MILLI-UNITS/MIN: at 12:04

## 2024-04-28 RX ADMIN — Medication: at 02:04

## 2024-04-28 RX ADMIN — PRENATAL VITAMINS-IRON FUMARATE 27 MG IRON-FOLIC ACID 0.8 MG TABLET 1 TABLET: at 09:04

## 2024-04-28 RX ADMIN — DOCUSATE SODIUM 200 MG: 100 CAPSULE, LIQUID FILLED ORAL at 08:04

## 2024-04-28 RX ADMIN — CALCIUM CARBONATE (ANTACID) CHEW TAB 500 MG 500 MG: 500 CHEW TAB at 07:04

## 2024-04-28 RX ADMIN — Medication 334 MILLI-UNITS/MIN: at 06:04

## 2024-04-28 RX ADMIN — DOCUSATE SODIUM 200 MG: 100 CAPSULE, LIQUID FILLED ORAL at 09:04

## 2024-04-28 RX ADMIN — HYDROCORTISONE: 25 CREAM TOPICAL at 05:04

## 2024-04-28 RX ADMIN — ACETAMINOPHEN 650 MG: 325 TABLET, FILM COATED ORAL at 01:04

## 2024-04-28 RX ADMIN — IBUPROFEN 600 MG: 600 TABLET, FILM COATED ORAL at 02:04

## 2024-04-28 RX ADMIN — ONDANSETRON 4 MG: 2 INJECTION INTRAMUSCULAR; INTRAVENOUS at 01:04

## 2024-04-28 NOTE — ANESTHESIA POSTPROCEDURE EVALUATION
Anesthesia Post Evaluation    Patient: Em Ferrer Lancon    Procedure(s) Performed: * No procedures listed *    Final Anesthesia Type: epidural      Patient location during evaluation: floor  Patient participation: Yes- Able to Participate  Level of consciousness: awake and alert  Post-procedure vital signs: reviewed and stable  Pain management: adequate  Airway patency: patent    PONV status at discharge: No PONV  Anesthetic complications: no      Respiratory status: unassisted  Hydration status: euvolemic  Follow-up not needed.              Vitals Value Taken Time   /62 04/28/24 0710   Temp 36.7 °C (98.1 °F) 04/28/24 0710   Pulse 77 04/28/24 0710   Resp 17 04/28/24 0710   SpO2 98 % 04/28/24 0029         No case tracking events are documented in the log.      Pain/Jessica Score: Pain Rating Prior to Med Admin: 2 (4/28/2024  2:39 AM)

## 2024-04-28 NOTE — L&D DELIVERY NOTE
[facility] - Labor and Delivery  OBGYN  Operative Note    SUMMARY     Date of Procedure: 2024    Procedure: Vacuum-asssit Spontaneous Vaginal Delivery    Surgeon: Guero Suazo MD    Post-Operative Diagnosis:   1. s/p  37w5d without complications  2. 2nd degree perineal laceration with repair      Anesthesia: epidural    Procedure in Detail: With good maternal effort a viable liveborn infant was delivered after approximately 150 minutes of pushing.  At 10/100/+3 for indication of NRFHT (recurrent variable decelerations) and matural exhaustion, a Kiwi Vacuum device was placed on the fetal vertex.  Over the course of 2 contractions, gentle downward traction was applied. No pop-offs. The fetal head delivered. Nuchal cord was not present. Remainder of infant delivered without difficulty. The placenta then delivered spontaneously, and was noted to be intact. The vagina, perineum, and cervix were examined. . After any necessary repairs were performed, all instruments and sponges were removed from the vagina. Sponge, lap, and needle counts were correct after the procedure.    Repair Suture: 2.0 vicryl in standard fashion    Infant: Liveborn female infant 3 vessel umbilical cord.  Apgars    Living status:   Apgar Component Scores:  1 min.:  5 min.:  10 min.:  15 min.:  20 min.:    Skin color:         Heart rate:         Reflex irritability:         Muscle tone:         Respiratory effort:         Total:                  Complications: none    Estimated Blood Loss (EBL): 150 cc           Condition: Mother and baby doing well

## 2024-04-28 NOTE — PLAN OF CARE
Problem: Adult Inpatient Plan of Care  Goal: Plan of Care Review  2024 by Shasta Bray RN  Outcome: Progressing  2024 by Shasta Bray RN  Outcome: Progressing  Goal: Patient-Specific Goal (Individualized)  Outcome: Progressing  Goal: Absence of Hospital-Acquired Illness or Injury  2024 by Shasta Bray RN  Outcome: Progressing  2024 by Shasta Bray RN  Outcome: Progressing  Goal: Optimal Comfort and Wellbeing  2024 by Shasta Bray RN  Outcome: Progressing  2024 by Shasta Bray RN  Outcome: Progressing  Goal: Readiness for Transition of Care  2024 by Shasta Bray RN  Outcome: Progressing  2024 by Shasta Bray RN  Outcome: Progressing     Problem: Diabetes in Pregnancy  Goal: Optimal Coping  2024 by Shasta Bray RN  Outcome: Progressing  2024 by Shasta Bray RN  Outcome: Progressing  Goal: Blood Glucose Level Within Targeted Range  2024 by Shasta Bray RN  Outcome: Progressing  2024 by Shasta Bray RN  Outcome: Progressing     Problem:  Fall Injury Risk  Goal: Absence of Fall, Infant Drop and Related Injury  2024 by Shasta Bray RN  Outcome: Progressing  2024 by Shasta Bray RN  Outcome: Progressing     Problem: Infection  Goal: Absence of Infection Signs and Symptoms  2024 by Shasta Bray RN  Outcome: Progressing  2024 by Shasta Bray RN  Outcome: Progressing     Problem: Postpartum (Vaginal Delivery)  Goal: Successful Parent Role Transition  Outcome: Progressing  Goal: Hemostasis  Outcome: Progressing  Goal: Absence of Infection Signs and Symptoms  Outcome: Progressing  Goal: Anesthesia/Sedation Recovery  Outcome: Progressing  Goal: Optimal Pain Control and Function  Outcome: Progressing  Goal: Effective Urinary Elimination  Outcome: Progressing     Problem:  Breastfeeding  Goal: Effective Breastfeeding  Outcome: Progressing

## 2024-04-28 NOTE — PLAN OF CARE
Problem: Adult Inpatient Plan of Care  Goal: Plan of Care Review  Outcome: Progressing  Goal: Patient-Specific Goal (Individualized)  Outcome: Progressing  Goal: Absence of Hospital-Acquired Illness or Injury  Outcome: Progressing  Goal: Optimal Comfort and Wellbeing  Outcome: Progressing  Goal: Readiness for Transition of Care  Outcome: Progressing     Problem: Diabetes in Pregnancy  Goal: Optimal Coping  Outcome: Progressing  Goal: Blood Glucose Level Within Targeted Range  Outcome: Progressing     Problem:  Fall Injury Risk  Goal: Absence of Fall, Infant Drop and Related Injury  Outcome: Progressing     Problem: Infection  Goal: Absence of Infection Signs and Symptoms  Outcome: Progressing     Problem: Labor  Goal: Hemostasis  Outcome: Progressing  Goal: Stable Fetal Wellbeing  Outcome: Progressing  Goal: Effective Progression to Delivery  Outcome: Progressing  Goal: Absence of Infection Signs and Symptoms  Outcome: Progressing  Goal: Acceptable Pain Control  Outcome: Progressing  Goal: Normal Uterine Contraction Pattern  Outcome: Progressing     Problem: Postpartum (Vaginal Delivery)  Goal: Successful Parent Role Transition  Outcome: Progressing  Goal: Hemostasis  Outcome: Progressing  Goal: Absence of Infection Signs and Symptoms  Outcome: Progressing  Goal: Anesthesia/Sedation Recovery  Outcome: Progressing  Goal: Optimal Pain Control and Function  Outcome: Progressing  Goal: Effective Urinary Elimination  Outcome: Progressing     Problem: Breastfeeding  Goal: Effective Breastfeeding  Outcome: Progressing

## 2024-04-29 LAB
POCT GLUCOSE: 96 MG/DL (ref 70–110)
POCT GLUCOSE: 97 MG/DL (ref 70–110)
POCT GLUCOSE: 98 MG/DL (ref 70–110)

## 2024-04-29 PROCEDURE — 25000003 PHARM REV CODE 250: Performed by: OBSTETRICS & GYNECOLOGY

## 2024-04-29 PROCEDURE — 11000001 HC ACUTE MED/SURG PRIVATE ROOM

## 2024-04-29 RX ADMIN — IBUPROFEN 600 MG: 600 TABLET, FILM COATED ORAL at 03:04

## 2024-04-29 RX ADMIN — PRENATAL VITAMINS-IRON FUMARATE 27 MG IRON-FOLIC ACID 0.8 MG TABLET 1 TABLET: at 09:04

## 2024-04-29 RX ADMIN — IBUPROFEN 600 MG: 600 TABLET, FILM COATED ORAL at 09:04

## 2024-04-29 RX ADMIN — DOCUSATE SODIUM 200 MG: 100 CAPSULE, LIQUID FILLED ORAL at 09:04

## 2024-04-29 RX ADMIN — ACETAMINOPHEN 650 MG: 325 TABLET, FILM COATED ORAL at 06:04

## 2024-04-29 RX ADMIN — IBUPROFEN 600 MG: 600 TABLET, FILM COATED ORAL at 10:04

## 2024-04-29 RX ADMIN — IBUPROFEN 600 MG: 600 TABLET, FILM COATED ORAL at 04:04

## 2024-04-29 NOTE — PLAN OF CARE
Problem: Adult Inpatient Plan of Care  Goal: Plan of Care Review  4/28/2024 2302 by Xuan Curtis RN  Outcome: Progressing  4/28/2024 2302 by Xuan Curtis RN  Outcome: Progressing  4/28/2024 2302 by Xuan Curtis RN  Outcome: Progressing  Goal: Patient-Specific Goal (Individualized)  4/28/2024 2302 by Xuan Curtis RN  Outcome: Progressing  4/28/2024 2302 by Xuan Curtis RN  Outcome: Progressing  4/28/2024 2302 by Xuan Curtis RN  Outcome: Progressing  Goal: Absence of Hospital-Acquired Illness or Injury  4/28/2024 2302 by Xuan Curtis RN  Outcome: Progressing  4/28/2024 2302 by Xuan Curtis RN  Outcome: Progressing  4/28/2024 2302 by Xuan Curtis RN  Outcome: Progressing  Goal: Optimal Comfort and Wellbeing  4/28/2024 2302 by Xuan Curtis RN  Outcome: Progressing  4/28/2024 2302 by Xuan Curtis RN  Outcome: Progressing  4/28/2024 2302 by Xuan Curtis RN  Outcome: Progressing  Goal: Readiness for Transition of Care  4/28/2024 2302 by Xuan Curtis RN  Outcome: Progressing  4/28/2024 2302 by Xuan Curtis RN  Outcome: Progressing  4/28/2024 2302 by Xuan Curtis RN  Outcome: Progressing     Problem: Postpartum (Vaginal Delivery)  Goal: Successful Parent Role Transition  4/28/2024 2302 by Xuan Curtis RN  Outcome: Progressing  4/28/2024 2302 by Xuan Curtis RN  Outcome: Progressing  4/28/2024 2302 by Xuan Curtis RN  Outcome: Progressing  Goal: Hemostasis  4/28/2024 2302 by Xuan Curtis RN  Outcome: Progressing  4/28/2024 2302 by Xuan Curtis RN  Outcome: Progressing  4/28/2024 2302 by Xuan Curtis RN  Outcome: Progressing  Goal: Absence of Infection Signs and Symptoms  4/28/2024 2302 by Xuan Curtis RN  Outcome: Progressing  4/28/2024 2302 by Xuan Curtis RN  Outcome: Progressing  4/28/2024 2302 by Xuan Curtis RN  Outcome: Progressing  Goal: Anesthesia/Sedation Recovery  4/28/2024 2302 by Xuan Curtis RN  Outcome: Progressing  4/28/2024 2302 by  Douet, Xuan, RN  Outcome: Progressing  4/28/2024 2302 by Xuan Curtis RN  Outcome: Progressing  Goal: Optimal Pain Control and Function  4/28/2024 2302 by Xuan Curtis RN  Outcome: Progressing  4/28/2024 2302 by Xuan Curtis RN  Outcome: Progressing  4/28/2024 2302 by Xuan Curtis RN  Outcome: Progressing  Goal: Effective Urinary Elimination  4/28/2024 2302 by Xuan Curtis RN  Outcome: Progressing  4/28/2024 2302 by Xuan Curtis RN  Outcome: Progressing  4/28/2024 2302 by Xuan Curtis RN  Outcome: Progressing     Problem: Breastfeeding  Goal: Effective Breastfeeding  4/28/2024 2302 by Xuan Curtis RN  Outcome: Progressing  4/28/2024 2302 by Xuan Curtis RN  Outcome: Progressing  4/28/2024 2302 by Xuan Curtis RN  Outcome: Progressing

## 2024-04-29 NOTE — DISCHARGE SUMMARY
"Delivery Discharge Summary  Obstetrics      Primary OB Clinician: Guero Suazo MD    Discharge Provider: Guero Suazo MD    Admission date: 2024  Discharge date: 2024    Admit Dx:  Term pregnancy     Discharge Dx:    Patient Active Problem List   Diagnosis    Cholestasis during pregnancy in third trimester    Rh negative status during pregnancy in third trimester    Gestational diabetes mellitus (GDM) in third trimester    Anxiety during pregnancy in third trimester, antepartum    Elevated blood pressure affecting pregnancy in third trimester, antepartum       Procedure: vaginal delivery    Hospital Course:  Em Longoria is a 23 y.o. now  who was admitted on 2024 for delivery. Patient delivered a viable  via vaginal delivery. Please see delivery note for further details. Pt was in stable condition post delivery and was transferred to the Mother-Baby Unit. Her postpartum course was uncomplicated. On the date of discharge, patient's pain is controlled with oral pain medications. She is tolerating ambulation without SOB or CP, and PO diet without N/V. Reported lochia is within the normal range. Pt in stable condition and ready for discharge.     Pertinent studies:  Postpartum CBC  Lab Results   Component Value Date    WBC 10.31 2024    HGB 12.5 2024    HCT 38.3 2024    MCV 88.7 2024     2024       Delivery:    Episiotomy: None   Lacerations: 2nd   Repair suture:     Repair # of packets: 1   Blood loss (ml):       Birth information:  YOB: 2026   Time of birth: 12:26 AM   Sex: female   Delivery type: Vaginal, Vacuum (Extractor)   Gestational Age: 37w5d     Measurements    Weight: 2810 g  Weight (lbs): 6 lb 3.1 oz  Length: 48.9 cm  Length (in): 19.25"  Head circumference: 33 cm         Delivery Clinician: Delivery Providers    Delivering clinician: Guero Suazo MD   Provider Role    Luna Marrero, RN Registered Nurse    " Leia Cesar, RN Registered Nurse    Radha Costa, RN Registered Nurse    Noa Guillen RN Registered Nurse    Jayna Montemayor RN Registered Nurse             Additional  information:  Forceps:    Vacuum:    Breech:    Observed anomalies      Living?:     Apgars    Living status: Living  Apgar Component Scores:  1 min.:  5 min.:  10 min.:  15 min.:  20 min.:    Skin color:  0  1       Heart rate:  2  2       Reflex irritability:  2  2       Muscle tone:  1  2       Respiratory effort:  2  2       Total:  7  9       Apgars assigned by: AZEEM MONTEMAYOR RN         Placenta: Delivered:       appearance    Disposition: To home, self care    Follow Up: 6 weeks    Patient Instructions:   1. Call the office for any bleeding >2 pads/hour for >2 hours, temperature >100.4, pain that is uncontrolled with medications, or for any other concerns.  2. Pelvic rest and no tub baths x 6 weeks.

## 2024-04-29 NOTE — PLAN OF CARE
Problem: Breastfeeding  Goal: Effective Breastfeeding  Outcome: Progressing  Intervention: Promote Breast Care and Comfort  Flowsheets (Taken 4/29/2024 1100)  Breast Care: Breastfeeding: open to air  Intervention: Promote Effective Breastfeeding  Flowsheets (Taken 4/29/2024 1100)  Breastfeeding Assistance:   assisted with positioning   feeding on demand promoted   feeding session observed   infant latch-on verified   support offered   infant suck/swallow verified   feeding cue recognition promoted  Parent-Child Attachment Promotion:   cue recognition promoted   positive reinforcement provided   skin-to-skin contact encouraged  Intervention: Support Exclusive Breastfeeding Success  Flowsheets (Taken 4/29/2024 1100)  Supportive Measures:   active listening utilized   counseling provided  Breastfeeding Support:   encouragement provided   lactation counseling provided

## 2024-04-30 VITALS
TEMPERATURE: 98 F | OXYGEN SATURATION: 98 % | HEART RATE: 75 BPM | HEIGHT: 66 IN | SYSTOLIC BLOOD PRESSURE: 121 MMHG | RESPIRATION RATE: 17 BRPM | BODY MASS INDEX: 24.75 KG/M2 | DIASTOLIC BLOOD PRESSURE: 79 MMHG | WEIGHT: 154 LBS

## 2024-04-30 LAB
ABO + RH BLD: NORMAL
ABO + RH BLD: NORMAL
BLD PROD TYP BPU: NORMAL
BLD PROD TYP BPU: NORMAL
BLOOD UNIT EXPIRATION DATE: NORMAL
BLOOD UNIT EXPIRATION DATE: NORMAL
BLOOD UNIT TYPE CODE: 9500
BLOOD UNIT TYPE CODE: 9500
CROSSMATCH INTERPRETATION: NORMAL
CROSSMATCH INTERPRETATION: NORMAL
DISPENSE STATUS: NORMAL
DISPENSE STATUS: NORMAL
UNIT NUMBER: NORMAL
UNIT NUMBER: NORMAL

## 2024-04-30 PROCEDURE — 25000003 PHARM REV CODE 250: Performed by: OBSTETRICS & GYNECOLOGY

## 2024-04-30 RX ORDER — IBUPROFEN 600 MG/1
600 TABLET ORAL EVERY 8 HOURS PRN
Qty: 40 TABLET | Refills: 1 | Status: SHIPPED | OUTPATIENT
Start: 2024-04-30

## 2024-04-30 RX ADMIN — ACETAMINOPHEN 650 MG: 325 TABLET, FILM COATED ORAL at 06:04

## 2024-04-30 RX ADMIN — IBUPROFEN 600 MG: 600 TABLET, FILM COATED ORAL at 03:04

## 2024-04-30 RX ADMIN — DOCUSATE SODIUM 200 MG: 100 CAPSULE, LIQUID FILLED ORAL at 11:04

## 2024-04-30 RX ADMIN — IBUPROFEN 600 MG: 600 TABLET, FILM COATED ORAL at 04:04

## 2024-04-30 RX ADMIN — IBUPROFEN 600 MG: 600 TABLET, FILM COATED ORAL at 11:04

## 2024-04-30 RX ADMIN — ACETAMINOPHEN 650 MG: 325 TABLET, FILM COATED ORAL at 12:04

## 2024-04-30 RX ADMIN — PRENATAL VITAMINS-IRON FUMARATE 27 MG IRON-FOLIC ACID 0.8 MG TABLET 1 TABLET: at 11:04

## 2024-04-30 NOTE — PLAN OF CARE
Problem: Adult Inpatient Plan of Care  Goal: Plan of Care Review  Outcome: Progressing  Goal: Patient-Specific Goal (Individualized)  Outcome: Progressing  Goal: Absence of Hospital-Acquired Illness or Injury  Outcome: Progressing  Goal: Optimal Comfort and Wellbeing  Outcome: Progressing  Goal: Readiness for Transition of Care  Outcome: Progressing     Problem: Postpartum (Vaginal Delivery)  Goal: Successful Parent Role Transition  Outcome: Progressing  Goal: Hemostasis  Outcome: Progressing  Goal: Absence of Infection Signs and Symptoms  Outcome: Progressing  Goal: Anesthesia/Sedation Recovery  Outcome: Progressing  Goal: Optimal Pain Control and Function  Outcome: Progressing  Goal: Effective Urinary Elimination  Outcome: Progressing     Problem: Breastfeeding  Goal: Effective Breastfeeding  Outcome: Progressing

## 2024-04-30 NOTE — NURSING
Went over discharge information with mother while family was at the bedside. Patient understood all information presented at this time and had no further questions. Patient will be rooming in with baby overnight due to baby having to be under triple phototherapy and having a bilirubin draw at 0600 5/1/24. Patient has own prescription of motrin that spouse picked up from retail pharmacy. Will give last dose of motrin before discharging out of the system.

## 2024-04-30 NOTE — PLAN OF CARE
Problem: Breastfeeding  Goal: Effective Breastfeeding  Outcome: Progressing  Intervention: Promote Breast Care and Comfort  Flowsheets (Taken 4/30/2024 1100)  Breast Care: Breastfeeding: open to air  Intervention: Promote Effective Breastfeeding  Flowsheets (Taken 4/30/2024 1100)  Breastfeeding Assistance:   feeding on demand promoted   feeding cue recognition promoted   support offered  Parent-Child Attachment Promotion:   skin-to-skin contact encouraged   positive reinforcement provided   cue recognition promoted  Intervention: Support Exclusive Breastfeeding Success  Flowsheets (Taken 4/30/2024 1100)  Supportive Measures:   counseling provided   positive reinforcement provided   active listening utilized  Breastfeeding Support:   lactation counseling provided   encouragement provided

## 2024-05-01 NOTE — PLAN OF CARE
Problem: Breastfeeding  Goal: Effective Breastfeeding  5/1/2024 1345 by Xuan Eli LPN  Outcome: Progressing  5/1/2024 1345 by Xuan Eli LPN  Outcome: Progressing  Intervention: Promote Breast Care and Comfort  Flowsheets (Taken 5/1/2024 1345)  Breast Care: Breastfeeding:   frequency of feeding adjusted   lanolin to nipples   manual expression to soften breast   milk massaged towards nipple   open to air   supportive bra utilized  Breast Pumping:   hand expression utilized   manual breast pump utilized  Intervention: Promote Effective Breastfeeding  Flowsheets (Taken 5/1/2024 1345)  Breastfeeding Assistance:   assisted with positioning   feeding cue recognition promoted   feeding on demand promoted   feeding session observed   infant latch-on verified   infant suck/swallow verified   support offered  Parent-Child Attachment Promotion:   positive reinforcement provided   participation in care promoted   strengths emphasized   skin-to-skin contact encouraged  Intervention: Support Exclusive Breastfeeding Success  Flowsheets (Taken 5/1/2024 1345)  Supportive Measures:   decision-making supported   goal-setting facilitated   counseling provided  Breastfeeding Support:   diary/feeding log utilized   lactation counseling provided   maternal hydration promoted   maternal nutrition promoted   maternal rest encouraged    Breastfeeding Discharge Instructions      Feed the baby at the earliest sign of hunger or comfort  Hands to mouth, sucking motions  Rooting or searching for something to suck on  Dont wait for crying - it is a late sign of hunger and comfort.    The feedings may be 8-12 times per 24hrs and will not follow a schedule  Avoid pacifiers and bottles for the first 4 weeks  Alternate the breast you start the feeding with, or start with the breast that feels the fullest  Switch breasts when the baby takes himself off the breast or falls asleep  Keep offering breasts until the baby looks full, no longer  gives hunger signs, and stays asleep when placed on his back in the crib  If the baby is sleepy and wont wake for a feeding, put the baby skin-to-skin dressed in a diaper against the mothers bare chest  Sleep near your baby  The baby should be positioned and latched on to the breast correctly  Chest-to-chest, chin in the breast  Babys lips are flipped outward  Babys mouth is stretched open wide like a shout  Babys sucking should feel like tugging to the mother  The baby should be drinking at the breast:  You should hear swallowing or gulping throughout the feeding  You should see milk on the babys lips when he comes off the breast  Your breasts should be softer when the baby is finished feeding  The baby should look relaxed at the end of feedings  After the 4th day and your milk is in:  The babys poop should turn bright yellow and be loose, watery, and seedy  The baby should have at least 3-4 poops the size of the palm of your hand per day  The baby should have at least 6-8 wet diapers per day  The urine should be light yellow in color  You should drink when you are thirsty and eat a healthy diet when you are    hungry.     Take naps to get the rest you need.   Take medications and/or drink alcohol only with permission of your obstetrician    or the babys pediatrician.  You can also call the Infant Risk Center,   ([phone]), Monday-Friday, 8am-5pm Central time, to get the most   up-to-date evidence-based information on the use of medications during   pregnancy and breastfeeding.      The baby should be examined by a pediatrician at 3-5 days of age.  Once your   milk comes in, the baby should be gaining at least ½ - 1oz each day and should be back to birthweight no later than 10-14 days of age.          Community Resources    [facility] Breastfeeding Warmline: 1737981804    Local [facility] clinics: provide incentives and breastpumps to eligible mothers  La Leche [employer] (LLLI):   mother-to-mother support group website        www.lll.org  [facility] Leche [facility] [place] mother-to-mother support groups:        www.[employer]        La Leche League [facility]         www.bushra@ROKT.com  Dr. Keshav Figueroa website for latch videos and general information:        www.breastfeedinginc.ca  [facility] Risk Center is a call center that provides information about the safety of taking medications while breastfeeding.  Call 1-726.254.6002, M-F, 8am-5pm, [place].  International Lactation Consultant Association provides resources for assistance:        www.ilca.org  [facility] Breastfeeding Coalition provides informationand resources for parents  and the community    http://ChristianaCareastfeeding.org     [employer]iat mom provides resources for assistance:        www.nolamom.org  [employer] Healthy Babies:  8-598-086-BABY(2987)  [facility] provides a list of breastfeeding services by zip code:        www.[facility].org  [employer]:  197.597.6439 a breastfeeding support group for women of color

## 2024-05-14 DIAGNOSIS — Z00.00 WELLNESS EXAMINATION: Primary | ICD-10-CM

## 2024-05-14 RX ORDER — PNV 112/IRON/FOLIC/OM3/DHA/EPA 3.33-.33MG
TABLET,CHEWABLE ORAL
Qty: 90 TABLET | Refills: 3 | Status: SHIPPED | OUTPATIENT
Start: 2024-05-14

## 2025-06-17 ENCOUNTER — LAB VISIT (OUTPATIENT)
Dept: LAB | Facility: HOSPITAL | Age: 25
End: 2025-06-17
Attending: OBSTETRICS & GYNECOLOGY
Payer: MEDICAID

## 2025-06-17 DIAGNOSIS — Z34.81 PRENATAL CARE, SUBSEQUENT PREGNANCY, FIRST TRIMESTER: Primary | ICD-10-CM

## 2025-06-17 LAB
C TRACH DNA SPEC QL NAA+PROBE: NOT DETECTED
ERYTHROCYTE [DISTWIDTH] IN BLOOD BY AUTOMATED COUNT: 13.1 % (ref 11.5–17)
GROUP & RH: NORMAL
HCT VFR BLD AUTO: 39.3 % (ref 37–47)
HGB BLD-MCNC: 12.4 G/DL (ref 12–16)
INDIRECT COOMBS: NORMAL
MCH RBC QN AUTO: 29 PG (ref 27–31)
MCHC RBC AUTO-ENTMCNC: 31.6 G/DL (ref 33–36)
MCV RBC AUTO: 91.8 FL (ref 80–94)
N GONORRHOEA DNA SPEC QL NAA+PROBE: NOT DETECTED
NRBC BLD AUTO-RTO: 0 %
PLATELET # BLD AUTO: 232 X10(3)/MCL (ref 130–400)
PMV BLD AUTO: 9.4 FL (ref 7.4–10.4)
RBC # BLD AUTO: 4.28 X10(6)/MCL (ref 4.2–5.4)
SPECIMEN OUTDATE: NORMAL
SPECIMEN SOURCE: NORMAL
T PALLIDUM AB SER QL: NONREACTIVE
WBC # BLD AUTO: 8.92 X10(3)/MCL (ref 4.5–11.5)

## 2025-06-17 PROCEDURE — 86901 BLOOD TYPING SEROLOGIC RH(D): CPT | Performed by: OBSTETRICS & GYNECOLOGY

## 2025-06-17 PROCEDURE — 87086 URINE CULTURE/COLONY COUNT: CPT

## 2025-06-17 PROCEDURE — 87389 HIV-1 AG W/HIV-1&-2 AB AG IA: CPT

## 2025-06-17 PROCEDURE — 82239 BILE ACIDS TOTAL: CPT

## 2025-06-17 PROCEDURE — 86803 HEPATITIS C AB TEST: CPT

## 2025-06-17 PROCEDURE — 85027 COMPLETE CBC AUTOMATED: CPT

## 2025-06-17 PROCEDURE — 36415 COLL VENOUS BLD VENIPUNCTURE: CPT

## 2025-06-17 PROCEDURE — 87491 CHLMYD TRACH DNA AMP PROBE: CPT

## 2025-06-17 PROCEDURE — 87340 HEPATITIS B SURFACE AG IA: CPT

## 2025-06-17 PROCEDURE — 86780 TREPONEMA PALLIDUM: CPT

## 2025-06-17 PROCEDURE — 86762 RUBELLA ANTIBODY: CPT

## 2025-06-18 LAB
BILE AC SERPL-SCNC: 3 MCMOL/L
HBV SURFACE AG SERPL QL IA: NONREACTIVE
HCV AB SERPL QL IA: NONREACTIVE
HIV 1+2 AB+HIV1 P24 AG SERPL QL IA: NONREACTIVE

## 2025-06-19 LAB
BACTERIA UR CULT: NORMAL
RUBV IGG SERPL IA-ACNC: 3.4
RUBV IGG SERPL QL IA: POSITIVE